# Patient Record
Sex: MALE | Race: WHITE | Employment: OTHER | URBAN - NONMETROPOLITAN AREA
[De-identification: names, ages, dates, MRNs, and addresses within clinical notes are randomized per-mention and may not be internally consistent; named-entity substitution may affect disease eponyms.]

---

## 2024-03-26 ENCOUNTER — HOSPITAL ENCOUNTER (INPATIENT)
Age: 69
LOS: 1 days | Discharge: HOME | DRG: 392 | End: 2024-03-28
Attending: EMERGENCY MEDICINE | Admitting: INTERNAL MEDICINE
Payer: MEDICARE

## 2024-03-26 ENCOUNTER — APPOINTMENT (OUTPATIENT)
Dept: CT IMAGING | Age: 69
DRG: 392 | End: 2024-03-26
Payer: MEDICARE

## 2024-03-26 ENCOUNTER — APPOINTMENT (OUTPATIENT)
Dept: GENERAL RADIOLOGY | Age: 69
DRG: 392 | End: 2024-03-26
Payer: MEDICARE

## 2024-03-26 DIAGNOSIS — R10.13 ACUTE EPIGASTRIC PAIN: ICD-10-CM

## 2024-03-26 DIAGNOSIS — R13.10 DYSPHAGIA, UNSPECIFIED TYPE: ICD-10-CM

## 2024-03-26 DIAGNOSIS — E27.8 ADRENAL MASS, RIGHT (HCC): ICD-10-CM

## 2024-03-26 DIAGNOSIS — R11.10 INTRACTABLE VOMITING: Primary | ICD-10-CM

## 2024-03-26 DIAGNOSIS — R07.9 CHEST PAIN, UNSPECIFIED TYPE: ICD-10-CM

## 2024-03-26 DIAGNOSIS — I10 PRIMARY HYPERTENSION: ICD-10-CM

## 2024-03-26 DIAGNOSIS — E11.9 TYPE 2 DIABETES MELLITUS WITHOUT COMPLICATION, WITHOUT LONG-TERM CURRENT USE OF INSULIN (HCC): ICD-10-CM

## 2024-03-26 LAB
ALBUMIN SERPL-MCNC: 4.8 G/DL (ref 3.5–5.2)
ALBUMIN/GLOB SERPL: 1.5 {RATIO} (ref 1–2.5)
ALP SERPL-CCNC: 67 U/L (ref 40–129)
ALT SERPL-CCNC: 27 U/L (ref 5–41)
ANION GAP SERPL CALCULATED.3IONS-SCNC: 12 MMOL/L (ref 9–17)
AST SERPL-CCNC: 22 U/L
BASOPHILS # BLD: <0.03 K/UL (ref 0–0.2)
BASOPHILS NFR BLD: 0 % (ref 0–2)
BILIRUB SERPL-MCNC: 1 MG/DL (ref 0.3–1.2)
BUN SERPL-MCNC: 14 MG/DL (ref 8–23)
BUN/CREAT SERPL: 18 (ref 9–20)
CALCIUM SERPL-MCNC: 9.7 MG/DL (ref 8.6–10.4)
CHLORIDE SERPL-SCNC: 97 MMOL/L (ref 98–107)
CO2 SERPL-SCNC: 28 MMOL/L (ref 20–31)
CREAT SERPL-MCNC: 0.8 MG/DL (ref 0.7–1.2)
EKG ATRIAL RATE: 68 BPM
EKG P AXIS: 57 DEGREES
EKG P-R INTERVAL: 148 MS
EKG Q-T INTERVAL: 442 MS
EKG QRS DURATION: 88 MS
EKG QTC CALCULATION (BAZETT): 469 MS
EKG R AXIS: -66 DEGREES
EKG T AXIS: 40 DEGREES
EKG VENTRICULAR RATE: 68 BPM
EOSINOPHIL # BLD: 0.42 K/UL (ref 0–0.44)
EOSINOPHILS RELATIVE PERCENT: 8 % (ref 1–4)
ERYTHROCYTE [DISTWIDTH] IN BLOOD BY AUTOMATED COUNT: 12.9 % (ref 11.8–14.4)
GFR SERPL CREATININE-BSD FRML MDRD: >90 ML/MIN/1.73M2
GLUCOSE SERPL-MCNC: 128 MG/DL (ref 70–99)
HCT VFR BLD AUTO: 42.8 % (ref 40.7–50.3)
HGB BLD-MCNC: 14.1 G/DL (ref 13–17)
IMM GRANULOCYTES # BLD AUTO: <0.03 K/UL (ref 0–0.3)
IMM GRANULOCYTES NFR BLD: 0 %
LACTATE BLDV-SCNC: 1.4 MMOL/L (ref 0.5–2.2)
LIPASE SERPL-CCNC: 26 U/L (ref 13–60)
LYMPHOCYTES NFR BLD: 1.39 K/UL (ref 1.1–3.7)
LYMPHOCYTES RELATIVE PERCENT: 28 % (ref 24–43)
MAGNESIUM SERPL-MCNC: 1.8 MG/DL (ref 1.6–2.6)
MCH RBC QN AUTO: 28.6 PG (ref 25.2–33.5)
MCHC RBC AUTO-ENTMCNC: 32.9 G/DL (ref 28.4–34.8)
MCV RBC AUTO: 86.8 FL (ref 82.6–102.9)
MONOCYTES NFR BLD: 0.64 K/UL (ref 0.1–1.2)
MONOCYTES NFR BLD: 13 % (ref 3–12)
NEUTROPHILS NFR BLD: 51 % (ref 36–65)
NEUTS SEG NFR BLD: 2.5 K/UL (ref 1.5–8.1)
NRBC BLD-RTO: 0 PER 100 WBC
PLATELET # BLD AUTO: 339 K/UL (ref 138–453)
PMV BLD AUTO: 9.7 FL (ref 8.1–13.5)
POTASSIUM SERPL-SCNC: 4 MMOL/L (ref 3.7–5.3)
PROT SERPL-MCNC: 7.9 G/DL (ref 6.4–8.3)
RBC # BLD AUTO: 4.93 M/UL (ref 4.21–5.77)
SODIUM SERPL-SCNC: 137 MMOL/L (ref 135–144)
TROPONIN I SERPL HS-MCNC: 9 NG/L (ref 0–22)
WBC OTHER # BLD: 5 K/UL (ref 3.5–11.3)

## 2024-03-26 PROCEDURE — 71045 X-RAY EXAM CHEST 1 VIEW: CPT

## 2024-03-26 PROCEDURE — 80053 COMPREHEN METABOLIC PANEL: CPT

## 2024-03-26 PROCEDURE — 93005 ELECTROCARDIOGRAM TRACING: CPT | Performed by: NURSE PRACTITIONER

## 2024-03-26 PROCEDURE — 2580000003 HC RX 258: Performed by: NURSE PRACTITIONER

## 2024-03-26 PROCEDURE — 83690 ASSAY OF LIPASE: CPT

## 2024-03-26 PROCEDURE — 85025 COMPLETE CBC W/AUTO DIFF WBC: CPT

## 2024-03-26 PROCEDURE — 6360000004 HC RX CONTRAST MEDICATION: Performed by: NURSE PRACTITIONER

## 2024-03-26 PROCEDURE — 74177 CT ABD & PELVIS W/CONTRAST: CPT

## 2024-03-26 PROCEDURE — 99285 EMERGENCY DEPT VISIT HI MDM: CPT

## 2024-03-26 PROCEDURE — 96374 THER/PROPH/DIAG INJ IV PUSH: CPT

## 2024-03-26 PROCEDURE — 83605 ASSAY OF LACTIC ACID: CPT

## 2024-03-26 PROCEDURE — A4216 STERILE WATER/SALINE, 10 ML: HCPCS | Performed by: NURSE PRACTITIONER

## 2024-03-26 PROCEDURE — 84484 ASSAY OF TROPONIN QUANT: CPT

## 2024-03-26 PROCEDURE — C9113 INJ PANTOPRAZOLE SODIUM, VIA: HCPCS | Performed by: NURSE PRACTITIONER

## 2024-03-26 PROCEDURE — 83735 ASSAY OF MAGNESIUM: CPT

## 2024-03-26 PROCEDURE — 93010 ELECTROCARDIOGRAM REPORT: CPT | Performed by: INTERNAL MEDICINE

## 2024-03-26 PROCEDURE — 6360000002 HC RX W HCPCS: Performed by: NURSE PRACTITIONER

## 2024-03-26 PROCEDURE — 96375 TX/PRO/DX INJ NEW DRUG ADDON: CPT

## 2024-03-26 RX ORDER — ASPIRIN 81 MG/1
81 TABLET, CHEWABLE ORAL DAILY
Status: ON HOLD | COMMUNITY
End: 2024-03-28 | Stop reason: HOSPADM

## 2024-03-26 RX ORDER — LISINOPRIL 10 MG/1
10 TABLET ORAL DAILY
COMMUNITY

## 2024-03-26 RX ORDER — ATORVASTATIN CALCIUM 80 MG/1
80 TABLET, FILM COATED ORAL DAILY
COMMUNITY

## 2024-03-26 RX ORDER — HYDROCHLOROTHIAZIDE 25 MG/1
25 TABLET ORAL DAILY
COMMUNITY

## 2024-03-26 RX ORDER — 0.9 % SODIUM CHLORIDE 0.9 %
1000 INTRAVENOUS SOLUTION INTRAVENOUS ONCE
Status: COMPLETED | OUTPATIENT
Start: 2024-03-26 | End: 2024-03-26

## 2024-03-26 RX ORDER — ONDANSETRON 2 MG/ML
4 INJECTION INTRAMUSCULAR; INTRAVENOUS ONCE
Status: COMPLETED | OUTPATIENT
Start: 2024-03-26 | End: 2024-03-26

## 2024-03-26 RX ADMIN — IOPAMIDOL 75 ML: 755 INJECTION, SOLUTION INTRAVENOUS at 22:34

## 2024-03-26 RX ADMIN — SODIUM CHLORIDE 1000 ML: 9 INJECTION, SOLUTION INTRAVENOUS at 21:19

## 2024-03-26 RX ADMIN — ONDANSETRON 4 MG: 2 INJECTION INTRAMUSCULAR; INTRAVENOUS at 21:18

## 2024-03-26 RX ADMIN — PANTOPRAZOLE SODIUM 80 MG: 40 INJECTION, POWDER, FOR SOLUTION INTRAVENOUS at 21:19

## 2024-03-26 ASSESSMENT — PAIN - FUNCTIONAL ASSESSMENT: PAIN_FUNCTIONAL_ASSESSMENT: NONE - DENIES PAIN

## 2024-03-26 ASSESSMENT — ENCOUNTER SYMPTOMS
VOMITING: 1
NAUSEA: 1
ABDOMINAL PAIN: 1

## 2024-03-26 ASSESSMENT — LIFESTYLE VARIABLES
HOW OFTEN DO YOU HAVE A DRINK CONTAINING ALCOHOL: NEVER
HOW MANY STANDARD DRINKS CONTAINING ALCOHOL DO YOU HAVE ON A TYPICAL DAY: PATIENT DOES NOT DRINK

## 2024-03-26 ASSESSMENT — HEART SCORE: ECG: NON-SPECIFC REPOLARIZATION DISTURBANCE/LBTB/PM

## 2024-03-27 ENCOUNTER — APPOINTMENT (OUTPATIENT)
Dept: CT IMAGING | Age: 69
DRG: 392 | End: 2024-03-27
Payer: MEDICARE

## 2024-03-27 ENCOUNTER — APPOINTMENT (OUTPATIENT)
Dept: MRI IMAGING | Age: 69
DRG: 392 | End: 2024-03-27
Payer: MEDICARE

## 2024-03-27 ENCOUNTER — ANESTHESIA EVENT (OUTPATIENT)
Dept: OPERATING ROOM | Age: 69
DRG: 392 | End: 2024-03-27
Payer: MEDICARE

## 2024-03-27 PROBLEM — I10 HYPERTENSION: Status: ACTIVE | Noted: 2024-03-27

## 2024-03-27 PROBLEM — E11.9 TYPE 2 DIABETES MELLITUS WITHOUT COMPLICATION, WITHOUT LONG-TERM CURRENT USE OF INSULIN (HCC): Status: ACTIVE | Noted: 2024-03-27

## 2024-03-27 PROBLEM — R10.13 ACUTE EPIGASTRIC PAIN: Status: ACTIVE | Noted: 2024-03-27

## 2024-03-27 LAB
ANION GAP SERPL CALCULATED.3IONS-SCNC: 12 MMOL/L (ref 9–17)
BASOPHILS # BLD: 0.03 K/UL (ref 0–0.2)
BASOPHILS NFR BLD: 0 % (ref 0–2)
BUN SERPL-MCNC: 11 MG/DL (ref 8–23)
BUN/CREAT SERPL: 16 (ref 9–20)
CALCIUM SERPL-MCNC: 8.6 MG/DL (ref 8.6–10.4)
CHLORIDE SERPL-SCNC: 104 MMOL/L (ref 98–107)
CO2 SERPL-SCNC: 24 MMOL/L (ref 20–31)
CREAT SERPL-MCNC: 0.7 MG/DL (ref 0.7–1.2)
EKG ATRIAL RATE: 75 BPM
EKG P AXIS: 57 DEGREES
EKG P-R INTERVAL: 140 MS
EKG Q-T INTERVAL: 438 MS
EKG QRS DURATION: 96 MS
EKG QTC CALCULATION (BAZETT): 489 MS
EKG R AXIS: -68 DEGREES
EKG T AXIS: 41 DEGREES
EKG VENTRICULAR RATE: 75 BPM
EOSINOPHIL # BLD: 0.35 K/UL (ref 0–0.44)
EOSINOPHILS RELATIVE PERCENT: 5 % (ref 1–4)
ERYTHROCYTE [DISTWIDTH] IN BLOOD BY AUTOMATED COUNT: 13 % (ref 11.8–14.4)
GFR SERPL CREATININE-BSD FRML MDRD: >90 ML/MIN/1.73M2
GLUCOSE SERPL-MCNC: 121 MG/DL (ref 70–99)
HCT VFR BLD AUTO: 37.9 % (ref 40.7–50.3)
HGB BLD-MCNC: 12.5 G/DL (ref 13–17)
IMM GRANULOCYTES # BLD AUTO: <0.03 K/UL (ref 0–0.3)
IMM GRANULOCYTES NFR BLD: 0 %
LIPASE SERPL-CCNC: 20 U/L (ref 13–60)
LYMPHOCYTES NFR BLD: 1.21 K/UL (ref 1.1–3.7)
LYMPHOCYTES RELATIVE PERCENT: 16 % (ref 24–43)
MCH RBC QN AUTO: 28.7 PG (ref 25.2–33.5)
MCHC RBC AUTO-ENTMCNC: 33 G/DL (ref 28.4–34.8)
MCV RBC AUTO: 87.1 FL (ref 82.6–102.9)
MONOCYTES NFR BLD: 0.88 K/UL (ref 0.1–1.2)
MONOCYTES NFR BLD: 12 % (ref 3–12)
NEUTROPHILS NFR BLD: 67 % (ref 36–65)
NEUTS SEG NFR BLD: 4.94 K/UL (ref 1.5–8.1)
NRBC BLD-RTO: 0 PER 100 WBC
PLATELET # BLD AUTO: 312 K/UL (ref 138–453)
PMV BLD AUTO: 9.7 FL (ref 8.1–13.5)
POTASSIUM SERPL-SCNC: 3.8 MMOL/L (ref 3.7–5.3)
RBC # BLD AUTO: 4.35 M/UL (ref 4.21–5.77)
SODIUM SERPL-SCNC: 140 MMOL/L (ref 135–144)
TROPONIN I SERPL HS-MCNC: 10 NG/L (ref 0–22)
TROPONIN I SERPL HS-MCNC: 9 NG/L (ref 0–22)
WBC OTHER # BLD: 7.4 K/UL (ref 3.5–11.3)

## 2024-03-27 PROCEDURE — 6360000002 HC RX W HCPCS: Performed by: EMERGENCY MEDICINE

## 2024-03-27 PROCEDURE — 94761 N-INVAS EAR/PLS OXIMETRY MLT: CPT

## 2024-03-27 PROCEDURE — 74183 MRI ABD W/O CNTR FLWD CNTR: CPT

## 2024-03-27 PROCEDURE — 1200000000 HC SEMI PRIVATE

## 2024-03-27 PROCEDURE — 36415 COLL VENOUS BLD VENIPUNCTURE: CPT

## 2024-03-27 PROCEDURE — 93005 ELECTROCARDIOGRAM TRACING: CPT

## 2024-03-27 PROCEDURE — A4216 STERILE WATER/SALINE, 10 ML: HCPCS

## 2024-03-27 PROCEDURE — C9113 INJ PANTOPRAZOLE SODIUM, VIA: HCPCS

## 2024-03-27 PROCEDURE — 93010 ELECTROCARDIOGRAM REPORT: CPT | Performed by: INTERNAL MEDICINE

## 2024-03-27 PROCEDURE — 85025 COMPLETE CBC W/AUTO DIFF WBC: CPT

## 2024-03-27 PROCEDURE — 2580000003 HC RX 258: Performed by: EMERGENCY MEDICINE

## 2024-03-27 PROCEDURE — 2580000003 HC RX 258

## 2024-03-27 PROCEDURE — 84484 ASSAY OF TROPONIN QUANT: CPT

## 2024-03-27 PROCEDURE — 6370000000 HC RX 637 (ALT 250 FOR IP): Performed by: EMERGENCY MEDICINE

## 2024-03-27 PROCEDURE — 6360000004 HC RX CONTRAST MEDICATION: Performed by: NURSE PRACTITIONER

## 2024-03-27 PROCEDURE — 71260 CT THORAX DX C+: CPT

## 2024-03-27 PROCEDURE — 83690 ASSAY OF LIPASE: CPT

## 2024-03-27 PROCEDURE — 80048 BASIC METABOLIC PNL TOTAL CA: CPT

## 2024-03-27 PROCEDURE — 96376 TX/PRO/DX INJ SAME DRUG ADON: CPT

## 2024-03-27 PROCEDURE — 6360000002 HC RX W HCPCS

## 2024-03-27 PROCEDURE — A9579 GAD-BASE MR CONTRAST NOS,1ML: HCPCS | Performed by: NURSE PRACTITIONER

## 2024-03-27 RX ORDER — SODIUM CHLORIDE 0.9 % (FLUSH) 0.9 %
10 SYRINGE (ML) INJECTION PRN
Status: DISCONTINUED | OUTPATIENT
Start: 2024-03-27 | End: 2024-03-28 | Stop reason: HOSPADM

## 2024-03-27 RX ORDER — ACETAMINOPHEN 325 MG/1
650 TABLET ORAL EVERY 6 HOURS PRN
Status: DISCONTINUED | OUTPATIENT
Start: 2024-03-27 | End: 2024-03-28 | Stop reason: HOSPADM

## 2024-03-27 RX ORDER — POLYETHYLENE GLYCOL 3350 17 G/17G
17 POWDER, FOR SOLUTION ORAL DAILY PRN
Status: DISCONTINUED | OUTPATIENT
Start: 2024-03-27 | End: 2024-03-28 | Stop reason: HOSPADM

## 2024-03-27 RX ORDER — ASPIRIN 81 MG/1
81 TABLET, CHEWABLE ORAL DAILY
Status: DISCONTINUED | OUTPATIENT
Start: 2024-03-27 | End: 2024-03-28 | Stop reason: HOSPADM

## 2024-03-27 RX ORDER — ENOXAPARIN SODIUM 100 MG/ML
40 INJECTION SUBCUTANEOUS DAILY
Status: DISCONTINUED | OUTPATIENT
Start: 2024-03-27 | End: 2024-03-28 | Stop reason: HOSPADM

## 2024-03-27 RX ORDER — SODIUM CHLORIDE 9 MG/ML
INJECTION, SOLUTION INTRAVENOUS CONTINUOUS
Status: DISCONTINUED | OUTPATIENT
Start: 2024-03-27 | End: 2024-03-28 | Stop reason: HOSPADM

## 2024-03-27 RX ORDER — SODIUM CHLORIDE 9 MG/ML
INJECTION, SOLUTION INTRAVENOUS PRN
Status: DISCONTINUED | OUTPATIENT
Start: 2024-03-27 | End: 2024-03-28 | Stop reason: HOSPADM

## 2024-03-27 RX ORDER — ONDANSETRON 2 MG/ML
4 INJECTION INTRAMUSCULAR; INTRAVENOUS EVERY 6 HOURS PRN
Status: DISCONTINUED | OUTPATIENT
Start: 2024-03-27 | End: 2024-03-28 | Stop reason: HOSPADM

## 2024-03-27 RX ORDER — ONDANSETRON 2 MG/ML
4 INJECTION INTRAMUSCULAR; INTRAVENOUS ONCE
Status: COMPLETED | OUTPATIENT
Start: 2024-03-27 | End: 2024-03-27

## 2024-03-27 RX ORDER — SODIUM CHLORIDE 0.9 % (FLUSH) 0.9 %
10 SYRINGE (ML) INJECTION EVERY 12 HOURS SCHEDULED
Status: DISCONTINUED | OUTPATIENT
Start: 2024-03-27 | End: 2024-03-28 | Stop reason: HOSPADM

## 2024-03-27 RX ORDER — LISINOPRIL 10 MG/1
10 TABLET ORAL DAILY
Status: DISCONTINUED | OUTPATIENT
Start: 2024-03-27 | End: 2024-03-28 | Stop reason: HOSPADM

## 2024-03-27 RX ORDER — POTASSIUM CHLORIDE 7.45 MG/ML
10 INJECTION INTRAVENOUS PRN
Status: DISCONTINUED | OUTPATIENT
Start: 2024-03-27 | End: 2024-03-28 | Stop reason: HOSPADM

## 2024-03-27 RX ORDER — 0.9 % SODIUM CHLORIDE 0.9 %
1000 INTRAVENOUS SOLUTION INTRAVENOUS ONCE
Status: COMPLETED | OUTPATIENT
Start: 2024-03-27 | End: 2024-03-27

## 2024-03-27 RX ORDER — HYDROCHLOROTHIAZIDE 25 MG/1
25 TABLET ORAL DAILY
Status: DISCONTINUED | OUTPATIENT
Start: 2024-03-27 | End: 2024-03-28 | Stop reason: HOSPADM

## 2024-03-27 RX ORDER — ACETAMINOPHEN 650 MG/1
650 SUPPOSITORY RECTAL EVERY 6 HOURS PRN
Status: DISCONTINUED | OUTPATIENT
Start: 2024-03-27 | End: 2024-03-28 | Stop reason: HOSPADM

## 2024-03-27 RX ORDER — POTASSIUM CHLORIDE 20 MEQ/1
40 TABLET, EXTENDED RELEASE ORAL PRN
Status: DISCONTINUED | OUTPATIENT
Start: 2024-03-27 | End: 2024-03-28 | Stop reason: HOSPADM

## 2024-03-27 RX ORDER — ONDANSETRON 4 MG/1
4 TABLET, ORALLY DISINTEGRATING ORAL EVERY 8 HOURS PRN
Status: DISCONTINUED | OUTPATIENT
Start: 2024-03-27 | End: 2024-03-28 | Stop reason: HOSPADM

## 2024-03-27 RX ORDER — ATORVASTATIN CALCIUM 40 MG/1
80 TABLET, FILM COATED ORAL DAILY
Status: DISCONTINUED | OUTPATIENT
Start: 2024-03-27 | End: 2024-03-28 | Stop reason: HOSPADM

## 2024-03-27 RX ORDER — FAMOTIDINE 20 MG/1
20 TABLET, FILM COATED ORAL 2 TIMES DAILY
Status: DISCONTINUED | OUTPATIENT
Start: 2024-03-27 | End: 2024-03-28 | Stop reason: HOSPADM

## 2024-03-27 RX ADMIN — ALUMINUM HYDROXIDE, MAGNESIUM HYDROXIDE, AND SIMETHICONE: 200; 200; 20 SUSPENSION ORAL at 02:54

## 2024-03-27 RX ADMIN — SODIUM CHLORIDE: 9 INJECTION, SOLUTION INTRAVENOUS at 17:40

## 2024-03-27 RX ADMIN — IOPAMIDOL 75 ML: 755 INJECTION, SOLUTION INTRAVENOUS at 12:30

## 2024-03-27 RX ADMIN — GADOTERIDOL 14 ML: 279.3 INJECTION, SOLUTION INTRAVENOUS at 16:46

## 2024-03-27 RX ADMIN — PANTOPRAZOLE SODIUM 40 MG: 40 INJECTION, POWDER, FOR SOLUTION INTRAVENOUS at 08:46

## 2024-03-27 RX ADMIN — SODIUM CHLORIDE: 9 INJECTION, SOLUTION INTRAVENOUS at 04:12

## 2024-03-27 RX ADMIN — ONDANSETRON 4 MG: 2 INJECTION INTRAMUSCULAR; INTRAVENOUS at 02:54

## 2024-03-27 RX ADMIN — SODIUM CHLORIDE 1000 ML: 9 INJECTION, SOLUTION INTRAVENOUS at 02:53

## 2024-03-27 RX ADMIN — ENOXAPARIN SODIUM 40 MG: 100 INJECTION SUBCUTANEOUS at 08:46

## 2024-03-27 ASSESSMENT — LIFESTYLE VARIABLES
HOW MANY STANDARD DRINKS CONTAINING ALCOHOL DO YOU HAVE ON A TYPICAL DAY: PATIENT DOES NOT DRINK
HOW OFTEN DO YOU HAVE A DRINK CONTAINING ALCOHOL: NEVER

## 2024-03-27 NOTE — CONSULTS
Patient was not able to be seen prior to discharge. Patient initially in MRI on 1st attempt. Patient in Endoscopy on 2nd attempt. Patient already discharged on 3rd attempt.

## 2024-03-27 NOTE — H&P
History and Physical    Patient:  Keven Iyer  MRN: [id number]    Chief Complaint: Emesis    History Obtained From:  patient, spouse, electronic medical record    PCP: Vernon Vincent DO    History of Present Illness:   The patient is a 68 y.o. male who presents with emesis and epigastric pain.  Patient states he started with epigastric pain after dinner this evening.  The pain radiated up into his throat and he began vomiting.  He states he has had approximately 10-12 episodes of vomiting.  He states that he has been having hiccups with it.  He states his emesis has been mostly clear with occasional undigested food.  He denies any diarrhea or constipation.  He denies chest pain, shortness of breath, syncope, fever or chills.  He states occasionally he gets reflux but this feels different.  Past medical history includes hypertension, type 2 diabetes, CVA and carotid endarterectomy.  Troponin 9 and 10, no leukocytosis, EKG shows normal sinus rhythm without ST or T wave changes.  CT abdomen pelvis showed no acute intra-abdominal or pelvic process and a small hiatal hernia.  Patient was given IV Protonix, Zofran and a GI cocktail in the emergency department without much improvement of symptoms.    Past Medical History:        Diagnosis Date    CVA (cerebral vascular accident) (HCC)     Diabetes mellitus (HCC)     Hypertension        Past Surgical History:        Procedure Laterality Date    CAROTID ENDARTERECTOMY         Medications Prior to Admission:    Prior to Admission medications    Medication Sig Start Date End Date Taking? Authorizing Provider   hydroCHLOROthiazide (HYDRODIURIL) 25 MG tablet Take 1 tablet by mouth daily   Yes ProviderCynthia MD   metFORMIN (GLUCOPHAGE) 1000 MG tablet Take 1 tablet by mouth 2 times daily (with meals)   Yes ProviderCynthia MD   lisinopril (PRINIVIL;ZESTRIL) 10 MG tablet Take 1 tablet by mouth daily   Yes ProviderCynthia MD   atorvastatin (LIPITOR) 80 MG tablet

## 2024-03-27 NOTE — ED PROVIDER NOTES
[facility] ED  EMERGENCY DEPARTMENT ENCOUNTER      Pt Name: Keven Iyer  MRN: [id number]  Birthdate 1955  Date of evaluation: 3/26/2024  Provider: RYAN Huang CNP  10:47 PM    CHIEF COMPLAINT       Chief Complaint   Patient presents with    Abdominal Pain     Epigastric pain radiating up throat and then vomit. Has had 10-12 episodes within the last 2 hours. Started during dinner.          HISTORY OF PRESENT ILLNESS        Keven Iyer 69 yo male presents from home to ED with c/o abdominal pain.Epigastric pain radiating up throat and then vomit. Has had 10-12 episodes within the last 2 hours. Hiccups intermittently. Started during dinner. No hx of reflex.  .    The history is provided by the patient. No  was used.       Nursing Notes were reviewed.    REVIEW OF SYSTEMS       Review of Systems   Gastrointestinal:  Positive for abdominal pain, nausea and vomiting.        Epigastric, hiccups, reflux   All other systems reviewed and are negative.      Except as noted above the remainder of the review of systems was reviewed and negative.       PAST MEDICAL HISTORY   History reviewed. No pertinent past medical history.      SURGICAL HISTORY     History reviewed. No pertinent surgical history.      CURRENT MEDICATIONS       Previous Medications    ASPIRIN 81 MG CHEWABLE TABLET    Take 1 tablet by mouth daily    ATORVASTATIN (LIPITOR) 80 MG TABLET    Take 1 tablet by mouth daily    HYDROCHLOROTHIAZIDE (HYDRODIURIL) 25 MG TABLET    Take 1 tablet by mouth daily    LISINOPRIL (PRINIVIL;ZESTRIL) 10 MG TABLET    Take 1 tablet by mouth daily    METFORMIN (GLUCOPHAGE) 1000 MG TABLET    Take 1 tablet by mouth 2 times daily (with meals)       ALLERGIES     Codeine    FAMILY HISTORY     History reviewed. No pertinent family history.       SOCIAL HISTORY       Social History     Socioeconomic History    Marital status:      Spouse name: None    Number of children: None    
and without gadolinium for further   evaluation.  Neoplasm not excluded.   3. Small hiatal hernia.   4. Fat containing left inguinal hernia.   5. Calcific coronary artery disease.   6. Slight anterior wedging T11-T12. Vacuum disc phenomena L5-S1.         XR CHEST PORTABLE   Final Result   No acute process.               ED BEDSIDE ULTRASOUND:   Performed by ED Physician - none    LABS:  Labs Reviewed   COMPREHENSIVE METABOLIC PANEL - Abnormal; Notable for the following components:       Result Value    Chloride 97 (*)     Glucose 128 (*)     All other components within normal limits   CBC WITH AUTO DIFFERENTIAL - Abnormal; Notable for the following components:    Monocytes % 13 (*)     Eosinophils % 8 (*)     All other components within normal limits   CBC WITH AUTO DIFFERENTIAL - Abnormal; Notable for the following components:    Hemoglobin 12.5 (*)     Hematocrit 37.9 (*)     Neutrophils % 67 (*)     Lymphocytes % 16 (*)     Eosinophils % 5 (*)     All other components within normal limits   MAGNESIUM   LIPASE   LACTIC ACID   TROPONIN   TROPONIN   BASIC METABOLIC PANEL W/ REFLEX TO MG FOR LOW K   TROPONIN   LIPASE       All other labs were within normal range or not returned as of this dictation.    EMERGENCY DEPARTMENT COURSE and DIFFERENTIAL DIAGNOSIS/MDM:   Vitals:    Vitals:    03/26/24 2055 03/27/24 0350 03/27/24 0407   BP: (!) 164/72 (!) 174/71 (!) 188/73   Pulse: 66  66   Resp: 16  18   Temp: 97.9 °F (36.6 °C)  97.9 °F (36.6 °C)   TempSrc: Oral  Temporal   SpO2: 97% 96% 98%   Weight: 66.2 kg (146 lb)  71 kg (156 lb 9.6 oz)   Height:   1.651 m (5' 5\")           MDM     Amount and/or Complexity of Data Reviewed  Clinical lab tests: reviewed        MIPS       REASSESSMENT          CRITICAL CARE TIME   Total Critical Care time was  minutes, excluding separately reportable procedures.  There was a high probability of clinically significant/life threatening deterioration in the patient's condition which required

## 2024-03-27 NOTE — FLOWSHEET NOTE
Vitals rechecked. Denies pain or nausea. States he is unable to keep even water down. No emesis since this morning. NPO. Call light within reach. Continue to monitor

## 2024-03-27 NOTE — FLOWSHEET NOTE
Resting in bed with eyes closed. Wake for vitals and assessment . States he has had 2 emesis since coming to the floor at 0400. None seen by nurses. Informed to save emesis for nurse to see. C/O mid epigastric discomfort this morning. No needs at present time. Call light within reach. Continue to monitor

## 2024-03-27 NOTE — ANESTHESIA PRE-OP
Chart reviewed as requested. The most recent EKG shows possible infarct. Cardiac consult/ surgical clearance requested.

## 2024-03-28 ENCOUNTER — APPOINTMENT (OUTPATIENT)
Age: 69
DRG: 392 | End: 2024-03-28
Attending: FAMILY MEDICINE
Payer: MEDICARE

## 2024-03-28 ENCOUNTER — ANESTHESIA (OUTPATIENT)
Dept: OPERATING ROOM | Age: 69
DRG: 392 | End: 2024-03-28
Payer: MEDICARE

## 2024-03-28 VITALS
WEIGHT: 156.53 LBS | HEIGHT: 65 IN | DIASTOLIC BLOOD PRESSURE: 65 MMHG | SYSTOLIC BLOOD PRESSURE: 159 MMHG | RESPIRATION RATE: 18 BRPM | OXYGEN SATURATION: 97 % | HEART RATE: 70 BPM | BODY MASS INDEX: 26.08 KG/M2 | TEMPERATURE: 97.5 F

## 2024-03-28 PROBLEM — K29.80 DUODENITIS: Status: ACTIVE | Noted: 2024-03-28

## 2024-03-28 PROBLEM — K20.90 ESOPHAGITIS: Status: ACTIVE | Noted: 2024-03-28

## 2024-03-28 LAB
ANION GAP SERPL CALCULATED.3IONS-SCNC: 13 MMOL/L (ref 9–17)
BASOPHILS # BLD: 0 K/UL (ref 0–0.2)
BASOPHILS NFR BLD: 0 % (ref 0–2)
BUN SERPL-MCNC: 11 MG/DL (ref 8–23)
BUN/CREAT SERPL: 16 (ref 9–20)
CALCIUM SERPL-MCNC: 8.6 MG/DL (ref 8.6–10.4)
CHLORIDE SERPL-SCNC: 104 MMOL/L (ref 98–107)
CO2 SERPL-SCNC: 22 MMOL/L (ref 20–31)
CREAT SERPL-MCNC: 0.7 MG/DL (ref 0.7–1.2)
ECHO AO ROOT DIAM: 3.3 CM
ECHO AO ROOT INDEX: 1.85 CM/M2
ECHO AV CUSP MM: 1.6 CM
ECHO AV MEAN GRADIENT: 5 MMHG
ECHO AV MEAN VELOCITY: 1 M/S
ECHO AV PEAK GRADIENT: 9 MMHG
ECHO AV PEAK VELOCITY: 1.5 M/S
ECHO AV VELOCITY RATIO: 0.67
ECHO AV VTI: 31.2 CM
ECHO BSA: 1.8 M2
ECHO LA AREA 2C: 13 CM2
ECHO LA AREA 4C: 13.1 CM2
ECHO LA MAJOR AXIS: 4.9 CM
ECHO LA MINOR AXIS: 4.3 CM
ECHO LA VOL BP: 32 ML (ref 18–58)
ECHO LA VOL MOD A2C: 32 ML (ref 18–58)
ECHO LA VOL MOD A4C: 28 ML (ref 18–58)
ECHO LA VOL/BSA BIPLANE: 18 ML/M2 (ref 16–34)
ECHO LA VOLUME INDEX MOD A2C: 18 ML/M2 (ref 16–34)
ECHO LA VOLUME INDEX MOD A4C: 16 ML/M2 (ref 16–34)
ECHO LV E' LATERAL VELOCITY: 8 CM/S
ECHO LV EDV A2C: 45 ML
ECHO LV EDV A4C: 58 ML
ECHO LV EDV INDEX A4C: 33 ML/M2
ECHO LV EDV NDEX A2C: 25 ML/M2
ECHO LV EJECTION FRACTION A2C: 58 %
ECHO LV EJECTION FRACTION A4C: 63 %
ECHO LV EJECTION FRACTION BIPLANE: 58 % (ref 55–100)
ECHO LV ESV A2C: 19 ML
ECHO LV ESV A4C: 22 ML
ECHO LV ESV INDEX A2C: 11 ML/M2
ECHO LV ESV INDEX A4C: 12 ML/M2
ECHO LV FRACTIONAL SHORTENING: 34 % (ref 28–44)
ECHO LV INTERNAL DIMENSION DIASTOLE INDEX: 2.47 CM/M2
ECHO LV INTERNAL DIMENSION DIASTOLIC: 4.4 CM (ref 4.2–5.9)
ECHO LV INTERNAL DIMENSION SYSTOLIC INDEX: 1.63 CM/M2
ECHO LV INTERNAL DIMENSION SYSTOLIC: 2.9 CM
ECHO LV IVSD: 0.9 CM (ref 0.6–1)
ECHO LV MASS 2D: 128 G (ref 88–224)
ECHO LV MASS INDEX 2D: 71.9 G/M2 (ref 49–115)
ECHO LV POSTERIOR WALL DIASTOLIC: 0.9 CM (ref 0.6–1)
ECHO LV RELATIVE WALL THICKNESS RATIO: 0.41
ECHO LVOT AV VTI INDEX: 0.51
ECHO LVOT MEAN GRADIENT: 2 MMHG
ECHO LVOT PEAK GRADIENT: 4 MMHG
ECHO LVOT PEAK VELOCITY: 1 M/S
ECHO LVOT VTI: 15.9 CM
ECHO MV A VELOCITY: 1.39 M/S
ECHO MV E DECELERATION TIME (DT): 177 MS
ECHO MV E VELOCITY: 1.14 M/S
ECHO MV E/A RATIO: 0.82
ECHO MV E/E' LATERAL: 14.25
ECHO PV MAX VELOCITY: 1.1 M/S
ECHO PV PEAK GRADIENT: 5 MMHG
EOSINOPHIL # BLD: 0.32 K/UL (ref 0–0.44)
EOSINOPHILS RELATIVE PERCENT: 7 % (ref 1–4)
ERYTHROCYTE [DISTWIDTH] IN BLOOD BY AUTOMATED COUNT: 12.9 % (ref 11.8–14.4)
GFR SERPL CREATININE-BSD FRML MDRD: >90 ML/MIN/1.73M2
GLUCOSE SERPL-MCNC: 101 MG/DL (ref 70–99)
HCT VFR BLD AUTO: 37.2 % (ref 40.7–50.3)
HGB BLD-MCNC: 12.3 G/DL (ref 13–17)
IMM GRANULOCYTES # BLD AUTO: 0 K/UL (ref 0–0.3)
IMM GRANULOCYTES NFR BLD: 0 %
LYMPHOCYTES NFR BLD: 1.4 K/UL (ref 1.1–3.7)
LYMPHOCYTES RELATIVE PERCENT: 31 % (ref 24–43)
MCH RBC QN AUTO: 28.8 PG (ref 25.2–33.5)
MCHC RBC AUTO-ENTMCNC: 33.1 G/DL (ref 28.4–34.8)
MCV RBC AUTO: 87.1 FL (ref 82.6–102.9)
MONOCYTES NFR BLD: 0.63 K/UL (ref 0.1–1.2)
MONOCYTES NFR BLD: 14 % (ref 3–12)
MORPHOLOGY: NORMAL
NEUTROPHILS NFR BLD: 48 % (ref 36–65)
NEUTS SEG NFR BLD: 2.15 K/UL (ref 1.5–8.1)
NRBC BLD-RTO: 0 PER 100 WBC
PLATELET # BLD AUTO: 308 K/UL (ref 138–453)
PMV BLD AUTO: 9.4 FL (ref 8.1–13.5)
POTASSIUM SERPL-SCNC: 4 MMOL/L (ref 3.7–5.3)
RBC # BLD AUTO: 4.27 M/UL (ref 4.21–5.77)
SODIUM SERPL-SCNC: 139 MMOL/L (ref 135–144)
WBC OTHER # BLD: 4.5 K/UL (ref 3.5–11.3)

## 2024-03-28 PROCEDURE — 6370000000 HC RX 637 (ALT 250 FOR IP): Performed by: INTERNAL MEDICINE

## 2024-03-28 PROCEDURE — 2580000003 HC RX 258: Performed by: NURSE ANESTHETIST, CERTIFIED REGISTERED

## 2024-03-28 PROCEDURE — C9113 INJ PANTOPRAZOLE SODIUM, VIA: HCPCS

## 2024-03-28 PROCEDURE — 7100000011 HC PHASE II RECOVERY - ADDTL 15 MIN: Performed by: INTERNAL MEDICINE

## 2024-03-28 PROCEDURE — 2709999900 HC NON-CHARGEABLE SUPPLY: Performed by: INTERNAL MEDICINE

## 2024-03-28 PROCEDURE — 6370000000 HC RX 637 (ALT 250 FOR IP): Performed by: NURSE PRACTITIONER

## 2024-03-28 PROCEDURE — 3609012400 HC EGD TRANSORAL BIOPSY SINGLE/MULTIPLE: Performed by: INTERNAL MEDICINE

## 2024-03-28 PROCEDURE — 43239 EGD BIOPSY SINGLE/MULTIPLE: CPT | Performed by: INTERNAL MEDICINE

## 2024-03-28 PROCEDURE — 3700000001 HC ADD 15 MINUTES (ANESTHESIA): Performed by: INTERNAL MEDICINE

## 2024-03-28 PROCEDURE — 2500000003 HC RX 250 WO HCPCS: Performed by: NURSE ANESTHETIST, CERTIFIED REGISTERED

## 2024-03-28 PROCEDURE — 36415 COLL VENOUS BLD VENIPUNCTURE: CPT

## 2024-03-28 PROCEDURE — 85025 COMPLETE CBC W/AUTO DIFF WBC: CPT

## 2024-03-28 PROCEDURE — 6360000002 HC RX W HCPCS: Performed by: NURSE ANESTHETIST, CERTIFIED REGISTERED

## 2024-03-28 PROCEDURE — 7100000010 HC PHASE II RECOVERY - FIRST 15 MIN: Performed by: INTERNAL MEDICINE

## 2024-03-28 PROCEDURE — 99221 1ST HOSP IP/OBS SF/LOW 40: CPT | Performed by: INTERNAL MEDICINE

## 2024-03-28 PROCEDURE — 2580000003 HC RX 258

## 2024-03-28 PROCEDURE — 94761 N-INVAS EAR/PLS OXIMETRY MLT: CPT

## 2024-03-28 PROCEDURE — 3700000000 HC ANESTHESIA ATTENDED CARE: Performed by: INTERNAL MEDICINE

## 2024-03-28 PROCEDURE — 0DJ08ZZ INSPECTION OF UPPER INTESTINAL TRACT, VIA NATURAL OR ARTIFICIAL OPENING ENDOSCOPIC: ICD-10-PCS | Performed by: INTERNAL MEDICINE

## 2024-03-28 PROCEDURE — A4216 STERILE WATER/SALINE, 10 ML: HCPCS

## 2024-03-28 PROCEDURE — 93306 TTE W/DOPPLER COMPLETE: CPT

## 2024-03-28 PROCEDURE — 6360000002 HC RX W HCPCS

## 2024-03-28 PROCEDURE — 80048 BASIC METABOLIC PNL TOTAL CA: CPT

## 2024-03-28 RX ORDER — PANTOPRAZOLE SODIUM 40 MG/1
40 TABLET, DELAYED RELEASE ORAL
Status: DISCONTINUED | OUTPATIENT
Start: 2024-03-29 | End: 2024-03-28 | Stop reason: HOSPADM

## 2024-03-28 RX ORDER — SUCRALFATE 1 G/1
1 TABLET ORAL
Qty: 120 TABLET | Refills: 0 | Status: SHIPPED | OUTPATIENT
Start: 2024-03-28 | End: 2024-04-27

## 2024-03-28 RX ORDER — PROPOFOL 10 MG/ML
INJECTION, EMULSION INTRAVENOUS PRN
Status: DISCONTINUED | OUTPATIENT
Start: 2024-03-28 | End: 2024-03-28 | Stop reason: SDUPTHER

## 2024-03-28 RX ORDER — SUCRALFATE 1 G/1
1 TABLET ORAL
Status: DISCONTINUED | OUTPATIENT
Start: 2024-03-28 | End: 2024-03-28 | Stop reason: HOSPADM

## 2024-03-28 RX ORDER — SODIUM CHLORIDE, SODIUM LACTATE, POTASSIUM CHLORIDE, CALCIUM CHLORIDE 600; 310; 30; 20 MG/100ML; MG/100ML; MG/100ML; MG/100ML
INJECTION, SOLUTION INTRAVENOUS CONTINUOUS PRN
Status: DISCONTINUED | OUTPATIENT
Start: 2024-03-28 | End: 2024-03-28 | Stop reason: SDUPTHER

## 2024-03-28 RX ORDER — LIDOCAINE HYDROCHLORIDE 20 MG/ML
INJECTION, SOLUTION EPIDURAL; INFILTRATION; INTRACAUDAL; PERINEURAL PRN
Status: DISCONTINUED | OUTPATIENT
Start: 2024-03-28 | End: 2024-03-28 | Stop reason: SDUPTHER

## 2024-03-28 RX ORDER — HYDRALAZINE HYDROCHLORIDE 20 MG/ML
INJECTION INTRAMUSCULAR; INTRAVENOUS PRN
Status: DISCONTINUED | OUTPATIENT
Start: 2024-03-28 | End: 2024-03-28 | Stop reason: SDUPTHER

## 2024-03-28 RX ORDER — SODIUM CHLORIDE, SODIUM LACTATE, POTASSIUM CHLORIDE, CALCIUM CHLORIDE 600; 310; 30; 20 MG/100ML; MG/100ML; MG/100ML; MG/100ML
INJECTION, SOLUTION INTRAVENOUS CONTINUOUS
Status: DISCONTINUED | OUTPATIENT
Start: 2024-03-28 | End: 2024-03-28 | Stop reason: HOSPADM

## 2024-03-28 RX ORDER — PANTOPRAZOLE SODIUM 40 MG/1
40 TABLET, DELAYED RELEASE ORAL
Qty: 30 TABLET | Refills: 0 | Status: SHIPPED | OUTPATIENT
Start: 2024-03-29 | End: 2024-04-28

## 2024-03-28 RX ADMIN — LIDOCAINE HYDROCHLORIDE 100 MG: 20 INJECTION, SOLUTION EPIDURAL; INFILTRATION; INTRACAUDAL; PERINEURAL at 10:38

## 2024-03-28 RX ADMIN — HYDRALAZINE HYDROCHLORIDE 10 MG: 20 INJECTION, SOLUTION INTRAMUSCULAR; INTRAVENOUS at 09:17

## 2024-03-28 RX ADMIN — PROPOFOL 150 MCG/KG/MIN: 10 INJECTION, EMULSION INTRAVENOUS at 10:39

## 2024-03-28 RX ADMIN — PROPOFOL 80 MG: 10 INJECTION, EMULSION INTRAVENOUS at 10:38

## 2024-03-28 RX ADMIN — SUCRALFATE 1 G: 1 TABLET ORAL at 12:42

## 2024-03-28 RX ADMIN — PANTOPRAZOLE SODIUM 40 MG: 40 INJECTION, POWDER, FOR SOLUTION INTRAVENOUS at 08:14

## 2024-03-28 RX ADMIN — SUCRALFATE 1 G: 1 TABLET ORAL at 16:47

## 2024-03-28 RX ADMIN — LISINOPRIL 10 MG: 10 TABLET ORAL at 13:24

## 2024-03-28 RX ADMIN — SODIUM CHLORIDE, POTASSIUM CHLORIDE, SODIUM LACTATE AND CALCIUM CHLORIDE: 600; 310; 30; 20 INJECTION, SOLUTION INTRAVENOUS at 10:31

## 2024-03-28 RX ADMIN — SODIUM CHLORIDE, PRESERVATIVE FREE 10 ML: 5 INJECTION INTRAVENOUS at 08:14

## 2024-03-28 RX ADMIN — HYDROCHLOROTHIAZIDE 25 MG: 25 TABLET ORAL at 13:24

## 2024-03-28 NOTE — FLOWSHEET NOTE
Awake, sitting up in bed. Vitals checked and assessment completed . Denies pain. States no nausea this morning. NPO for an EGD today. No needs at present time. Call light within reach. Continue to monitor

## 2024-03-28 NOTE — CARE COORDINATION
Case Management Assessment  Initial Evaluation    Date/Time of Evaluation: 3/28/2024 2:10 PM  Assessment Completed by: Karena Hernandez MSW LSW     If patient is discharged prior to next notation, then this note serves as note for discharge by case management.    Patient Name: Keven Iyer                   YOB: 1955  Diagnosis: Intractable vomiting [R11.10]  Acute epigastric pain [R10.13]  Chest pain, unspecified type [R07.9]                   Date / Time: 3/26/2024  8:56 PM    Patient Admission Status: Inpatient   Readmission Risk (Low < 19, Mod (19-27), High > 27): Readmission Risk Score: 5.4    Current PCP: Vernon Vincent, DO  PCP verified by CM? Yes    Chart Reviewed: Yes      History Provided by: Patient, Medical Record  Patient Orientation: Alert and Oriented, Person, Place, Situation    Patient Cognition: Alert    Hospitalization in the last 30 days (Readmission):  No    If yes, Readmission Assessment in  Navigator will be completed.    Advance Directives:      Code Status: Full Code   Patient's Primary Decision Maker is: Legal Next of Kin (Pt reports that he has advance directives but not on file here)    Primary Decision Maker: Luz Iyer - Spouse - [phone]    Discharge Planning:    Patient lives with: Spouse/Significant Other Type of Home: House  Primary Care Giver: Self  Patient Support Systems include: Spouse/Significant Other   Current Financial resources: Medicare  Current community resources: None  Current services prior to admission: None            Current DME:  None            Type of Home Care services:  None    ADLS  Prior functional level: Independent in ADLs/IADLs  Current functional level: Independent in ADLs/IADLs    PT AM-PAC:   /24  OT AM-PAC:   /24    Family can provide assistance at DC: Yes  Would you like Case Management to discuss the discharge plan with any other family members/significant others, and if so, who? Yes (Spouse present in room)  Plans to

## 2024-03-28 NOTE — H&P (VIEW-ONLY)
Gastroenterology Consult Note    Patient:   Keven Iyer   Admit date:  3/26/2024  Facility:   [facility]  Referring/PCP: Vernon Vincent DO  Date:     3/27/2024  Consultant:   YOSHI KIRK MD, MD    Subjective:     Keven Iyer is a 68 y.o. male was admitted 3/26/2024 with a diagnosis of \"Intractable vomiting [R11.10]  Acute epigastric pain [R10.13]  Chest pain, unspecified type [R07.9]\" and is seen in consultation regarding   Chief Complaint   Patient presents with    Abdominal Pain     Epigastric pain radiating up throat and then vomit. Has had 10-12 episodes within the last 2 hours. Started during dinner.    He reports that the dysphagia started. He denies any prior episodes and reports that he has not had any previous upper endoscopy. He also states that he has not had a colonoscopy. He denies any hematemesis or hematochezia. He denies any recent NSAID use and also denies any weight loss. His presenting hemoglobin was 14.1.       Past Medical History:  Past Medical History:   Diagnosis Date    CVA (cerebral vascular accident) (HCC)     Diabetes mellitus (HCC)     Hypertension        Past Surgical History:  Past Surgical History:   Procedure Laterality Date    CAROTID ENDARTERECTOMY         Social History:  Social History     Tobacco Use    Smoking status: Former     Types: Cigarettes    Smokeless tobacco: Never   Substance Use Topics    Alcohol use: Yes     Comment: occ.       Family History:   History reviewed. No pertinent family history.    Diet:  Diet NPO    Scheduled Medications:    [Held by provider] aspirin  81 mg Oral Daily    [Held by provider] atorvastatin  80 mg Oral Daily    [MAR Hold] hydroCHLOROthiazide  25 mg Oral Daily    [MAR Hold] lisinopril  10 mg Oral Daily    [Held by provider] metFORMIN  1,000 mg Oral BID WC    [MAR Hold] sodium chloride flush  10 mL IntraVENous 2 times per day    [Held by provider] enoxaparin  40 mg SubCUTAneous Daily    [Held by provider]

## 2024-03-28 NOTE — FLOWSHEET NOTE
Sitting up in bed eating a late lunch. Denies nausea or difficulty swallowing. No needs at present time.

## 2024-03-28 NOTE — PLAN OF CARE
Problem: Discharge Planning  Goal: Discharge to home or other facility with appropriate resources  3/27/2024 2156 by Amberly Valerio, RN  Outcome: Progressing     Problem: Chronic Conditions and Co-morbidities  Goal: Patient's chronic conditions and co-morbidity symptoms are monitored and maintained or improved  3/27/2024 2156 by Amberly Valerio, RN  Outcome: Progressing

## 2024-03-28 NOTE — ANESTHESIA POSTPROCEDURE EVALUATION
Department of Anesthesiology  Postprocedure Note    Patient: Keven Iyer  MRN: [id number]  YOB: 1955  Date of evaluation: 3/28/2024    Procedure Summary       Date: 03/28/24 Room / Location: [place] [address] / [facility]    Anesthesia Start: 1031 Anesthesia Stop: 1050    Procedure: ESOPHAGOGASTRODUODENOSCOPY BIOPSY Diagnosis:       Dysphagia, unspecified type      (Dysphagia, unspecified type [R13.10])    Surgeons: Mahsa Colon MD Responsible Provider: Amberly Webb APRN - CRNA    Anesthesia Type: TIVA, MAC ASA Status: 4            Anesthesia Type: TIVA, MAC    Brittanie Phase I: Brittanie Score: 9    Brittanie Phase II: Brittanie Score: 10    Anesthesia Post Evaluation    Patient location during evaluation: bedside  Patient participation: complete - patient participated  Level of consciousness: awake and alert  Pain score: 0  Airway patency: patent  Nausea & Vomiting: no nausea and no vomiting  Cardiovascular status: hemodynamically stable  Respiratory status: acceptable  Hydration status: stable  Pain management: adequate        No notable events documented.

## 2024-03-28 NOTE — INTERVAL H&P NOTE
Update History & Physical    The patient's History and Physical of March 27, 2024 was reviewed with the patient and I examined the patient. There was no change. The surgical site was confirmed by the patient and me.     Plan: The risks, benefits, expected outcome, and alternative to the recommended procedure have been discussed with the patient. Patient understands and wants to proceed with the procedure.     Electronically signed by YOSHI KIRK MD on 3/28/2024 at 10:17 AM

## 2024-03-28 NOTE — DISCHARGE SUMMARY
Discharge Summary    Keven Iyer  :  1955  MRN:  [id number]    Admit date:  3/26/2024      Discharge date: 3/28/2024     Admitting Physician:  Adelso Guzman MD    Discharge Diagnoses:    Principal Problem:    Acute epigastric pain  Active Problems:    Hypertension    Type 2 diabetes mellitus without complication, without long-term current use of insulin (HCC)    Duodenitis    Esophagitis  Resolved Problems:    * No resolved hospital problems. *      Hospital Course:   Keven Iyer is a 68 y.o. male admitted with dysphagia.  He presented with complaints of vomiting and epigastric pain.  He reported this began after having dinner.  Pain radiated into his throat and began vomiting.  He reported 10-12 episodes of vomiting.  He complained of hiccups.  He reported clear vomit with occasional undigested food.  He denied diarrhea constipation.  He denied weight loss.  He denied chest pain or shortness of breath.  He denied fever or chills.  He does occasionally have gastric reflux but reported this felt different than his baseline.  He does have history of hypertension, diabetes, CVA, carotid endarterectomy.  Troponins were 9 and 10.  He had no leukocytosis on his labs.  EKG showed sinus rhythm without ST or T wave changes.  CT abdomen and pelvis showed no acute intra-abdominal or pelvic process and a small hiatal hernia.  Patient was given IV Protonix, Zofran and a GI cocktail in the emergency room without much improvement.  Upon admission GI was consulted for possible EGD.  CT chest which was completed and patient was found to have a distended esophagus containing fluid and debris without appreciable area of narrowing in the esophagus or GE junction.  Etiology was unclear and recommended EGD.  Patient was also found to have a right adrenal gland mass concerning for pheochromocytoma.  MRI was completed at that time and patient was found to have a 3.3 x 3.4 x 2.6 cm right adrenal mass concerning for

## 2024-03-28 NOTE — PLAN OF CARE
Problem: Discharge Planning  Goal: Discharge to home or other facility with appropriate resources  Outcome: Adequate for Discharge     Problem: Chronic Conditions and Co-morbidities  Goal: Patient's chronic conditions and co-morbidity symptoms are monitored and maintained or improved  Outcome: Adequate for Discharge     Problem: Nutrition Deficit:  Goal: Optimize nutritional status  Outcome: Adequate for Discharge     Problem: Pain  Goal: Verbalizes/displays adequate comfort level or baseline comfort level  Outcome: Adequate for Discharge

## 2024-03-28 NOTE — OP NOTE
[facility]    EGD    PROCEDURE DATE: 03/28/24    REFERRING PHYSICIAN: No ref. provider found     PRIMARY CARE PROVIDER: Vernon Vincent DO    ATTENDING PHYSICIAN: YOSHI KIRK MD     HISTORY: Mr. Keven Iyer is a 68 y.o. male who presents to the  Endoscopy unit for upper endoscopy. The patient's clinical history is remarkable for DM II. He is currently medically stable and appropriate for the planned procedure.       PREOPERATIVE DIAGNOSIS: Dysphagia.     PROCEDURES:   1) Transoral Upper Endoscopy.     POSTOPERATIVE DIAGNOSIS: Esophagitis, gastritis     MEDICATIONS:   MAC per anesthesia      EBL:  2 ml    INSTRUMENT: Olympus GIF-H190  flexible Gastroscope.     PREPARATION: The nature and character of the procedure as well as risks, benefits, and alternatives were discussed with the patient and informed consent was obtained. Complications were said to include, but were not limited to: medication allergy, medication reaction, cardiovascular and respiratory problems, bleeding, perforation, infection, and/or missed diagnosis. Following arrival in the endoscopy room, the patient was placed in the left lateral decubitus position and final time-out accomplished in the presence of the nursing staff. Baseline vital signs were obtained and reviewed, and IV sedation was subsequently initiated.     FINDINGS:   Esophagus: The esophagus was inspected to the Z-line. The endoscopic exam showed edematous and erythematous GEJ at 41 cm from the incisor with no ulcerations.     Stomach: The stomach was inspected in both forward and retroflex fashion and was appropriately distensible. The cardia, fundus, incisura, antrum and pylorus were identified via direct visualization. The endoscopic exam showed erosions in the gastric antrum with no bleeding ulcers and no ulcers on incisura on retroflex.     Duodenum: The proximal small bowel was inspected through the bulb, sweep, and second portion of the duodenum. The

## 2024-03-28 NOTE — CONSULTS
Gastroenterology Consult Note    Patient:   Keevn Iyer   Admit date:  3/26/2024  Facility:   [facility]  Referring/PCP: Vernon Vincent DO  Date:     3/27/2024  Consultant:   YOSHI KIRK MD, MD    Subjective:     Keven Iyer is a 68 y.o. male was admitted 3/26/2024 with a diagnosis of \"Intractable vomiting [R11.10]  Acute epigastric pain [R10.13]  Chest pain, unspecified type [R07.9]\" and is seen in consultation regarding   Chief Complaint   Patient presents with    Abdominal Pain     Epigastric pain radiating up throat and then vomit. Has had 10-12 episodes within the last 2 hours. Started during dinner.    He reports that the dysphagia started. He denies any prior episodes and reports that he has not had any previous upper endoscopy. He also states that he has not had a colonoscopy. He denies any hematemesis or hematochezia. He denies any recent NSAID use and also denies any weight loss. His presenting hemoglobin was 14.1.       Past Medical History:  Past Medical History:   Diagnosis Date    CVA (cerebral vascular accident) (HCC)     Diabetes mellitus (HCC)     Hypertension        Past Surgical History:  Past Surgical History:   Procedure Laterality Date    CAROTID ENDARTERECTOMY         Social History:  Social History     Tobacco Use    Smoking status: Former     Types: Cigarettes    Smokeless tobacco: Never   Substance Use Topics    Alcohol use: Yes     Comment: occ.       Family History:   History reviewed. No pertinent family history.    Diet:  Diet NPO    Scheduled Medications:    [Held by provider] aspirin  81 mg Oral Daily    [Held by provider] atorvastatin  80 mg Oral Daily    [MAR Hold] hydroCHLOROthiazide  25 mg Oral Daily    [MAR Hold] lisinopril  10 mg Oral Daily    [Held by provider] metFORMIN  1,000 mg Oral BID WC    [MAR Hold] sodium chloride flush  10 mL IntraVENous 2 times per day    [Held by provider] enoxaparin  40 mg SubCUTAneous Daily    [Held by provider]

## 2024-03-28 NOTE — FLOWSHEET NOTE
Returned from EGD per stretcher. Up to bathroom with assist.to bed. Vitals checked and assessment completed. Denies pain. Order for clear liquid diet. Patient wants to stat with ice chip. Call light within reach. Continue to monitor

## 2024-03-28 NOTE — PROGRESS NOTES
Comprehensive Nutrition Assessment    Type and Reason for Visit:  Initial    Nutrition Recommendations/Plan:   Resume diet as GI feasible.      Malnutrition Assessment:  Malnutrition Status:  No malnutrition (03/27/24 1118)    Context:  Acute Illness     Findings of the 6 clinical characteristics of malnutrition:  Energy Intake:  Mild decrease in energy intake (Comment) (24 hours)  Weight Loss:  No significant weight loss     Body Fat Loss:  No significant body fat loss     Muscle Mass Loss:  No significant muscle mass loss    Fluid Accumulation:  No significant fluid accumulation     Strength:  Not Performed    Nutrition Assessment:    Inadequate nutrient intakes r/t altered GI status, AEB emesis with pending EGD.  Known diabetes which is under control per stated A1C 6.6 at Dr. Vincent office. No usual ingestion concerns and casually monitors diet per interview.  Does avoid salt r/t HTN. No deficits for chew or swallow with CVA history.    Nutrition Related Findings:    no malnutrition indices. no edema. + emesis. Wound Type: None       Current Nutrition Intake & Therapies:    Average Meal Intake: NPO  Average Supplements Intake: NPO  Diet NPO    Anthropometric Measures:  Height: 165.1 cm (5' 5\")  Ideal Body Weight (IBW): 136 lbs (62 kg)    Admission Body Weight: 66.2 kg (146 lb)  Current Body Weight: 71 kg (156 lb 9.6 oz), 115.1 % IBW. Weight Source: Bed Scale  Current BMI (kg/m2): 26.1  Usual Body Weight: 67.1 kg (148 lb) (upper end of normal at home)  % Weight Change (Calculated): 5.8  Weight Adjustment For: No Adjustment                 BMI Categories: Overweight (BMI 25.0-29.9)    Estimated Daily Nutrient Needs:  Energy Requirements Based On: Kcal/kg  Weight Used for Energy Requirements: Current  Energy (kcal/day): 8219-8852 (22-27)  Weight Used for Protein Requirements: Ideal  Protein (g/day): 74-86 (1.2-1.4)  Method Used for Fluid Requirements: 1 ml/kcal  Fluid (ml/day): 1900    Nutrition Diagnosis: 
Discharge Criteria    Inpatients must meet Criteria 1 through 7. All other patients are either YES or N/A. If a NO is chosen then Anesthesia or Surgeon must be notified.      1.  Minimum 30 minutes after last dose of sedative medication.    Yes      2.  Systolic BP between 90 - 160. Diastolic BP between 60 - 90.    Yes      3.  Pulse between 60 - 120    Yes      4.  Respirations between 8 - 25.    Yes      5.  SpO2 92% - 100%.    Yes      6.  Able to cough and swallow or return to baseline function.    Yes      7.  Alert and oriented or return to baseline mental status.    Yes      8.  Demonstrates controlled, coordinated movements, ambulates with steady gait, or return to baseline activity function.    N/A      9.  Minimal or no pain or nausea, or at a level tolerable and acceptable to patient.    N/A      10. Takes and retains oral fluids as allowed.    N/A      11. Procedural / perioperative site stable.  Minimal or no bleeding.    N/A          12. If GI endoscopy procedure, minimal or no abdominal distention or passing flatus.    Yes      13. Written discharge instructions and emergency telephone number provided.    N/A      14. Accompanied by a responsible adult.    N/A              
Dr Colon paged to room  
Dr Isaiah pollack side speaking to pt and wife.  
Progress Note    SUBJECTIVE:    Patient seen for f/u of Acute epigastric pain.  He resting in bed with visitor at side.  Vomits after trying to swallow.   No weight loss    ROS:   Constitutional: negative  for fevers, and negative for chills.  Respiratory: negative for shortness of breath, negative for cough, and negative for wheezing  Cardiovascular: negative for chest pain, and negative for palpitations  Gastrointestinal: negative for abdominal pain, positive for nausea,positive for vomiting, negative for diarrhea, and negative for constipation     All other systems were reviewed with the patient and are negative unless otherwise stated in HPI      OBJECTIVE:      Vitals:   Vitals:    03/27/24 0714   BP: (!) 154/68   Pulse: 64   Resp: 18   Temp: 97.2 °F (36.2 °C)   SpO2: 98%     Weight - Scale: 71 kg (156 lb 9.6 oz)   Height: 165.1 cm (5' 5\")     Weight  Wt Readings from Last 3 Encounters:   03/27/24 71 kg (156 lb 9.6 oz)     Body mass index is 26.06 kg/m².    24HR INTAKE/OUTPUT:    No intake or output data in the 24 hours ending 03/27/24 0931  -----------------------------------------------------------------  Exam:    GEN:    Awake, alert and oriented x3.   EYES:  EOMI, pupils equal   NECK: Supple. No lymphadenopathy.  No carotid bruit  CVS:    regular rate and rhythm, no audible murmur  PULM:  CTA, no wheezes, rales or rhonchi, no acute respiratory distress  ABD:    Bowels sounds normal.  Abdomen is soft.  No distention.  no tenderness to palpation.   EXT:   no edema bilaterally .  No calf tenderness.   NEURO: Moves all extremities.  Motor and sensory are grossly intact  SKIN:  No rashes.  No skin lesions.    -----------------------------------------------------------------    Diagnostic Data:      Complete Blood Count:   Recent Labs     03/26/24  2103 03/27/24  0540   WBC 5.0 7.4   RBC 4.93 4.35   HGB 14.1 12.5*   HCT 42.8 37.9*   MCV 86.8 87.1   MCH 28.6 28.7   MCHC 32.9 33.0   RDW 12.9 13.0    312 
[facility] Pt able to ambulate from chair to bed without assistance. Vitals and assessment completed as charted. Complains of nausea, hiccupping and heartburn. Pt denies any current needs at this time. Call light is within reach, family at bedside. Care ongoing.  
Pt's room cleaned and organized. Trash and linen changed at this time. Pt denies any needs from writer at this time. Call light within reach. Care is ongoing.  
Reviewed discharge instructions with patient and spouse.  Patient aware of new medications.  Spouse already pick medications up from [facility].  Reviewed new medications and side effects to monitor for.  Patient aware of date/time of follow up appointments.  Aware of need to get PET scan.  Provided patient with scheduling phone number to schedule PET scan.  Instructed to eat a regular diet but to avoid foods that make symptoms worse such as chocolate, mint, alcohol, pepper, spicy foods, high-fat foods, or drinks with caffeine such as tea, coffee, betty or energy drinks.  Reviewed EGD instructions.  Educational handouts given on Esophagitis, Gastritis, Carafate, and NSAID's.  Questions answered.  Verbalizes understanding.  Copy of discharge instructions given to patient.  
Spiritual Services Interventions  0312/0312-01   3/28/2024  Dylan Iyer  68 y.o. year old male    Encounter Summary  Encounter Overview/Reason : (P) Initial Encounter  Service Provided For:: (P) Patient  Referral/Consult From:: (P) Rounding  Last Encounter : (P) 03/28/24  Complexity of Encounter: (P) Moderate  Begin Time: (P) 1300  End Time : (P) 1315  Total Time Calculated: (P) 15 min     Spiritual/Emotional needs  Type: (P) Spiritual Support                    Assessment/Intervention/Outcome  Assessment: (P) Calm  Intervention: (P) Discussed belief system/Zoroastrianism practices/ashlyn, Discussed illness injury and it’s impact  Outcome: (P) Encouraged, Engaged in conversation     
Writer at bedside to complete evening assessment. Upon entry to room, pt awake and in bed, respirations normal and unlabored while on room air. Vitals obtained and assessment completed, see flow sheet for details. Pt denies needs from writer at this time. Call light in reach. Care is ongoing.     
  RBC 4.93 4.35 4.27   HGB 14.1 12.5* 12.3*   HCT 42.8 37.9* 37.2*   MCV 86.8 87.1 87.1   MCH 28.6 28.7 28.8   MCHC 32.9 33.0 33.1   RDW 12.9 13.0 12.9    312 308   MPV 9.7 9.7 9.4        Last 3 Blood Glucose:   Recent Labs     03/26/24 2103 03/27/24  0540 03/28/24  0533   GLUCOSE 128* 121* 101*        Comprehensive Metabolic Profile:   Recent Labs     03/26/24 2103 03/27/24  0540 03/28/24  0533    140 139   K 4.0 3.8 4.0   CL 97* 104 104   CO2 28 24 22   BUN 14 11 11   CREATININE 0.8 0.7 0.7   GLUCOSE 128* 121* 101*   CALCIUM 9.7 8.6 8.6   PROT 7.9  --   --    LABALBU 4.8  --   --    BILITOT 1.0  --   --    ALKPHOS 67  --   --    AST 22  --   --    ALT 27  --   --         Urinalysis: No results found for: \"NITRU\", \"COLORU\", \"PHUR\", \"LABCAST\", \"WBCUA\", \"RBCUA\", \"MUCUS\", \"TRICHOMONAS\", \"YEAST\", \"BACTERIA\", \"CLARITYU\", \"SPECGRAV\", \"LEUKOCYTESUR\", \"UROBILINOGEN\", \"BILIRUBINUR\", \"BLOODU\", \"GLUCOSEU\", \"KETUA\", \"AMORPHOUS\"    HgBA1c:  No results found for: \"LABA1C\"    Lactic Acid:   Lab Results   Component Value Date/Time    LACTA 1.4 03/26/2024 09:16 PM        Troponin: No results for input(s): \"TROPONINI\" in the last 72 hours.    CRP:  No results for input(s): \"CRP\" in the last 72 hours.      Radiology/Imaging:  MRI ABDOMEN W WO CONTRAST   Final Result   1. 3.3 x 3.4 x 2.6 cm right adrenal mass, with heterogeneous architecture and   calcification.  The finding is highly suspicious for adrenal gland neoplasm.   PET-CT scan or tissue sampling is recommended for further evaluation.  There   are no evidence there is no intrinsic fat to suggest adenoma.   2. Multiple T2 hyperintense foci in the right lobe of the liver with arterial   phase hyperenhancement without washout. These findings may represent flash   filling hemangiomas or FNH. Follow-up MRI in six months is recommended to   confirm stability.   3. Small hiatal hernia.         CT CHEST W CONTRAST   Final Result   Distended esophagus containing fluid

## 2024-03-28 NOTE — FLOWSHEET NOTE
Tolerating a clear liquid diet with no c/o pain or nausea. Increased to a soft GI diet. Patient aware of discharge order. Will try a regular diet and discharge if tolerates .

## 2024-03-28 NOTE — ANESTHESIA PRE PROCEDURE
Department of Anesthesiology  Preprocedure Note       Name:  Keven Iyer   Age:  68 y.o.  :  1955                                          MRN:  [id number]         Date:  3/28/2024      Surgeon: Surgeon(s):  Mahsa Colon MD    Procedure: Procedure(s):  ESOPHAGOGASTRODUODENOSCOPY    Medications prior to admission:   Prior to Admission medications    Medication Sig Start Date End Date Taking? Authorizing Provider   hydroCHLOROthiazide (HYDRODIURIL) 25 MG tablet Take 1 tablet by mouth daily   Yes ProviderCynthia MD   metFORMIN (GLUCOPHAGE) 1000 MG tablet Take 1 tablet by mouth 2 times daily (with meals)   Yes Provider, MD Cynthia   lisinopril (PRINIVIL;ZESTRIL) 10 MG tablet Take 1 tablet by mouth daily   Yes ProviderCynthia MD   atorvastatin (LIPITOR) 80 MG tablet Take 1 tablet by mouth daily   Yes ProviderCynthia MD   aspirin 81 MG chewable tablet Take 1 tablet by mouth daily   Yes ProviderCynthia MD       Current medications:    Current Facility-Administered Medications   Medication Dose Route Frequency Provider Last Rate Last Admin    [Held by provider] aspirin chewable tablet 81 mg  81 mg Oral Daily Heydi Herron, APRN - CNP        [Held by provider] atorvastatin (LIPITOR) tablet 80 mg  80 mg Oral Daily Heydi Herron L, APRN - CNP        [MAR Hold] hydroCHLOROthiazide (HYDRODIURIL) tablet 25 mg  25 mg Oral Daily Heydi Herron L, APRN - CNP        [MAR Hold] lisinopril (PRINIVIL;ZESTRIL) tablet 10 mg  10 mg Oral Daily Heydi Herron L, APRN - CNP        [Held by provider] metFORMIN (GLUCOPHAGE) tablet 1,000 mg  1,000 mg Oral BID WC Heydi Herron L, APRN - CNP        [MAR Hold] 0.9 % sodium chloride infusion   IntraVENous Continuous GermaineHeydi duron L, APRN - CNP 75 mL/hr at 24 1740 New Bag at 24 1740    [MAR Hold] sodium chloride flush 0.9 % injection 10 mL  10 mL IntraVENous 2 times per day Heydi Herron, APRN - CNP   10 mL at 24 0814    [MAR Hold] sodium chloride

## 2024-04-01 ENCOUNTER — OFFICE VISIT (OUTPATIENT)
Dept: ONCOLOGY | Age: 69
End: 2024-04-01
Payer: MEDICARE

## 2024-04-01 ENCOUNTER — HOSPITAL ENCOUNTER (OUTPATIENT)
Age: 69
Discharge: HOME | End: 2024-04-01
Payer: MEDICARE

## 2024-04-01 VITALS
HEIGHT: 65 IN | DIASTOLIC BLOOD PRESSURE: 68 MMHG | SYSTOLIC BLOOD PRESSURE: 128 MMHG | RESPIRATION RATE: 18 BRPM | TEMPERATURE: 97.3 F | HEART RATE: 69 BPM | BODY MASS INDEX: 23.82 KG/M2 | WEIGHT: 143 LBS

## 2024-04-01 DIAGNOSIS — E27.8 ADRENAL MASS (HCC): ICD-10-CM

## 2024-04-01 DIAGNOSIS — E27.8 ADRENAL MASS (HCC): Primary | ICD-10-CM

## 2024-04-01 LAB — SURGICAL PATHOLOGY REPORT: NORMAL

## 2024-04-01 PROCEDURE — G8420 CALC BMI NORM PARAMETERS: HCPCS | Performed by: INTERNAL MEDICINE

## 2024-04-01 PROCEDURE — 36415 COLL VENOUS BLD VENIPUNCTURE: CPT

## 2024-04-01 PROCEDURE — 99205 OFFICE O/P NEW HI 60 MIN: CPT | Performed by: INTERNAL MEDICINE

## 2024-04-01 PROCEDURE — 99202 OFFICE O/P NEW SF 15 MIN: CPT | Performed by: INTERNAL MEDICINE

## 2024-04-01 PROCEDURE — 3078F DIAST BP <80 MM HG: CPT | Performed by: INTERNAL MEDICINE

## 2024-04-01 PROCEDURE — 82384 ASSAY THREE CATECHOLAMINES: CPT

## 2024-04-01 PROCEDURE — G8428 CUR MEDS NOT DOCUMENT: HCPCS | Performed by: INTERNAL MEDICINE

## 2024-04-01 PROCEDURE — 3074F SYST BP LT 130 MM HG: CPT | Performed by: INTERNAL MEDICINE

## 2024-04-01 NOTE — PROGRESS NOTES
history, performing a medically appropriate examination, counseling and educating the patient/family/caregiver and ordering medications, tests, or procedures.                                 Yonas Pollock MD                          [facility]                            This note is created with the assistance of a speech recognition program.  While intending to generate a document that actually reflects the content of the visit, the document can still have some errors including those of syntax and sound a like substitutions which may escape proof reading.  It such instances, actual meaning can be extrapolated by contextual diversion.

## 2024-04-05 ENCOUNTER — HOSPITAL ENCOUNTER (OUTPATIENT)
Age: 69
Discharge: HOME | End: 2024-04-05
Payer: MEDICARE

## 2024-04-05 DIAGNOSIS — E27.8 ADRENAL MASS (HCC): ICD-10-CM

## 2024-04-05 DIAGNOSIS — E27.8 ADRENAL MASS (HCC): Primary | ICD-10-CM

## 2024-04-05 PROCEDURE — 82384 ASSAY THREE CATECHOLAMINES: CPT

## 2024-04-05 PROCEDURE — 36415 COLL VENOUS BLD VENIPUNCTURE: CPT

## 2024-04-10 LAB
DOPAMINE SERPL-MCNC: 164 PMOL/L
EPINEPHRINE PLASMA: 85 PMOL/L
NOREPINEPHRINE: 8589 PMOL/L (ref 1050–4800)

## 2024-04-16 ENCOUNTER — TELEPHONE (OUTPATIENT)
Dept: GASTROENTEROLOGY | Age: 69
End: 2024-04-16

## 2024-04-16 NOTE — TELEPHONE ENCOUNTER
----- Message from Mahsa Colon MD sent at 4/16/2024  9:07 AM EDT -----  Please notify patient: No H. pylori infection, no celiac disease, no Posey's esophagus.  Esophagitis noted likely related to reflux.     Risk factors for worsening GERD including hiatal hernia, weight gain, medication noncompliance, fatty foods, coffee, alcohol, tomato-based foods, spicy foods, citrus, carbonated drinks, NSAID use, and eating just before bedtime are all known to cause a worsening of acid reflux despite use of proton pump inhibitors. These also represent modifiable risk factors of acid reflux.

## 2024-04-17 DIAGNOSIS — C79.71: Primary | ICD-10-CM

## 2024-04-17 DIAGNOSIS — C80.1: Primary | ICD-10-CM

## 2024-04-18 ENCOUNTER — HOSPITAL ENCOUNTER (OUTPATIENT)
Dept: PET IMAGING | Age: 69
Discharge: HOME | End: 2024-04-20
Payer: MEDICARE

## 2024-04-18 DIAGNOSIS — E27.8 ADRENAL MASS (HCC): ICD-10-CM

## 2024-04-18 PROCEDURE — 78815 PET IMAGE W/CT SKULL-THIGH: CPT

## 2024-04-18 PROCEDURE — 3430000000 HC RX DIAGNOSTIC RADIOPHARMACEUTICAL: Performed by: INTERNAL MEDICINE

## 2024-04-18 PROCEDURE — A9609 HC RX DIAGNOSTIC RADIOPHARMACEUTICAL: HCPCS | Performed by: INTERNAL MEDICINE

## 2024-04-18 RX ORDER — FLUDEOXYGLUCOSE F 18 200 MCI/ML
14 INJECTION, SOLUTION INTRAVENOUS
Status: COMPLETED | OUTPATIENT
Start: 2024-04-18 | End: 2024-04-18

## 2024-04-18 RX ADMIN — FLUDEOXYGLUCOSE F 18 14 MILLICURIE: 200 INJECTION, SOLUTION INTRAVENOUS at 12:31

## 2024-04-22 ENCOUNTER — OFFICE VISIT (OUTPATIENT)
Dept: ONCOLOGY | Age: 69
End: 2024-04-22
Payer: MEDICARE

## 2024-04-22 VITALS
HEART RATE: 62 BPM | SYSTOLIC BLOOD PRESSURE: 135 MMHG | HEIGHT: 65 IN | DIASTOLIC BLOOD PRESSURE: 52 MMHG | TEMPERATURE: 96.6 F | BODY MASS INDEX: 23.82 KG/M2 | WEIGHT: 143 LBS | RESPIRATION RATE: 18 BRPM

## 2024-04-22 DIAGNOSIS — E27.8 ADRENAL MASS (HCC): Primary | ICD-10-CM

## 2024-04-22 DIAGNOSIS — D35.01 PHEOCHROMOCYTOMA OF RIGHT ADRENAL GLAND: ICD-10-CM

## 2024-04-22 PROCEDURE — 99215 OFFICE O/P EST HI 40 MIN: CPT | Performed by: INTERNAL MEDICINE

## 2024-04-22 PROCEDURE — 1123F ACP DISCUSS/DSCN MKR DOCD: CPT | Performed by: INTERNAL MEDICINE

## 2024-04-22 PROCEDURE — G8420 CALC BMI NORM PARAMETERS: HCPCS | Performed by: INTERNAL MEDICINE

## 2024-04-22 PROCEDURE — 3078F DIAST BP <80 MM HG: CPT | Performed by: INTERNAL MEDICINE

## 2024-04-22 PROCEDURE — 1111F DSCHRG MED/CURRENT MED MERGE: CPT | Performed by: INTERNAL MEDICINE

## 2024-04-22 PROCEDURE — 3017F COLORECTAL CA SCREEN DOC REV: CPT | Performed by: INTERNAL MEDICINE

## 2024-04-22 PROCEDURE — 3075F SYST BP GE 130 - 139MM HG: CPT | Performed by: INTERNAL MEDICINE

## 2024-04-22 PROCEDURE — 99211 OFF/OP EST MAY X REQ PHY/QHP: CPT | Performed by: INTERNAL MEDICINE

## 2024-04-22 PROCEDURE — 1036F TOBACCO NON-USER: CPT | Performed by: INTERNAL MEDICINE

## 2024-04-22 PROCEDURE — G8427 DOCREV CUR MEDS BY ELIG CLIN: HCPCS | Performed by: INTERNAL MEDICINE

## 2024-04-22 NOTE — PROGRESS NOTES
some accumulation of radiotracer  in the distal stomach and proximal duodenum which should be correlated  clinically.  Findings may be related to inflammation.  Other etiology not  excluded as the duodenal wall appears prominent.    The patient's right adrenal mass demonstrates abnormal metabolic activity  with heterogeneity.  Combination of findings on PET-CT with MRI imaging  favors adrenal neoplasia.  No metabolically active intraperitoneal masses.  No metabolically active pelvic or inguinal adenopathy.    BONES/SOFT TISSUE: No abnormal radiotracer uptake in the bones or  subcutaneous soft tissues.  No metabolically active axillary adenopathy.    INCIDENTAL CT FINDINGS: Atherosclerosis of the aorta and coronary arteries.  Calcification within the right adrenal mass is noted.  Fairly extensive  colonic diverticulosis is present.  Wall thickening of the distal gastric  antrum and duodenum.  Impression: 1. Right adrenal mass demonstrates abnormal metabolic activity with  heterogeneity. Combination of findings on PET-CT with MRI imaging favors  adrenal neoplasia.  SUV maximum 9.69.  2. Incidentally noted is some accumulation of radiotracer in the distal  stomach and proximal duodenum, SUV maximum 8.5 which should be correlated  clinically. Findings may be related to inflammation. Other etiology not  excluded as the duodenal wall appears prominent.  3. Fairly extensive colonic diverticulosis.  4. Atherosclerosis of the aorta and coronary arteries.    RECOMMENDATIONS:  Advise biopsy of the right adrenal gland.  Consider endoscopy with attention  to the distal stomach and duodenum.       ASSESSMENT:       Diagnosis Orders   1. Adrenal mass (HCC)  Referral to Interventional Radiology      2. Pheochromocytoma of right adrenal gland  Referral to Interventional Radiology    Fahad Burdick MD, Cardiology, [place]                 PLAN:     I personally reviewed results of lab work-up imaging studies,outside records and

## 2024-04-25 DIAGNOSIS — D35.01 PHEOCHROMOCYTOMA OF RIGHT ADRENAL GLAND: ICD-10-CM

## 2024-04-25 DIAGNOSIS — E27.8 ADRENAL MASS (HCC): Primary | ICD-10-CM

## 2024-05-17 ENCOUNTER — OFFICE VISIT (OUTPATIENT)
Dept: CARDIOLOGY | Age: 69
End: 2024-05-17
Payer: MEDICARE

## 2024-05-17 VITALS
OXYGEN SATURATION: 96 % | DIASTOLIC BLOOD PRESSURE: 62 MMHG | HEART RATE: 55 BPM | RESPIRATION RATE: 18 BRPM | BODY MASS INDEX: 24.03 KG/M2 | WEIGHT: 144.2 LBS | SYSTOLIC BLOOD PRESSURE: 133 MMHG | HEIGHT: 65 IN

## 2024-05-17 DIAGNOSIS — Z01.810 PREOP CARDIOVASCULAR EXAM: Primary | ICD-10-CM

## 2024-05-17 DIAGNOSIS — Z87.891 FORMER SMOKER: ICD-10-CM

## 2024-05-17 DIAGNOSIS — I10 PRIMARY HYPERTENSION: ICD-10-CM

## 2024-05-17 DIAGNOSIS — Z86.73 HISTORY OF STROKE: ICD-10-CM

## 2024-05-17 DIAGNOSIS — E78.2 MIXED HYPERLIPIDEMIA: ICD-10-CM

## 2024-05-17 DIAGNOSIS — E27.8 ADRENAL MASS (HCC): ICD-10-CM

## 2024-05-17 DIAGNOSIS — R94.31 ABNORMAL ELECTROCARDIOGRAPHY: ICD-10-CM

## 2024-05-17 DIAGNOSIS — R09.89 BRUIT OF LEFT CAROTID ARTERY: ICD-10-CM

## 2024-05-17 DIAGNOSIS — E11.9 TYPE 2 DIABETES MELLITUS WITHOUT COMPLICATION, WITHOUT LONG-TERM CURRENT USE OF INSULIN (HCC): ICD-10-CM

## 2024-05-17 DIAGNOSIS — I25.10 CORONARY ARTERY CALCIFICATION SEEN ON CT SCAN: ICD-10-CM

## 2024-05-17 PROCEDURE — 99211 OFF/OP EST MAY X REQ PHY/QHP: CPT | Performed by: PHYSICIAN ASSISTANT

## 2024-05-17 PROCEDURE — 2022F DILAT RTA XM EVC RTNOPTHY: CPT | Performed by: PHYSICIAN ASSISTANT

## 2024-05-17 PROCEDURE — 3075F SYST BP GE 130 - 139MM HG: CPT | Performed by: PHYSICIAN ASSISTANT

## 2024-05-17 PROCEDURE — 3078F DIAST BP <80 MM HG: CPT | Performed by: PHYSICIAN ASSISTANT

## 2024-05-17 PROCEDURE — 93005 ELECTROCARDIOGRAM TRACING: CPT | Performed by: PHYSICIAN ASSISTANT

## 2024-05-17 PROCEDURE — 3017F COLORECTAL CA SCREEN DOC REV: CPT | Performed by: PHYSICIAN ASSISTANT

## 2024-05-17 PROCEDURE — G8420 CALC BMI NORM PARAMETERS: HCPCS | Performed by: PHYSICIAN ASSISTANT

## 2024-05-17 PROCEDURE — 99204 OFFICE O/P NEW MOD 45 MIN: CPT | Performed by: PHYSICIAN ASSISTANT

## 2024-05-17 PROCEDURE — 93010 ELECTROCARDIOGRAM REPORT: CPT | Performed by: PHYSICIAN ASSISTANT

## 2024-05-17 PROCEDURE — 1123F ACP DISCUSS/DSCN MKR DOCD: CPT | Performed by: PHYSICIAN ASSISTANT

## 2024-05-17 PROCEDURE — G8427 DOCREV CUR MEDS BY ELIG CLIN: HCPCS | Performed by: PHYSICIAN ASSISTANT

## 2024-05-17 PROCEDURE — 3046F HEMOGLOBIN A1C LEVEL >9.0%: CPT | Performed by: PHYSICIAN ASSISTANT

## 2024-05-17 PROCEDURE — 1036F TOBACCO NON-USER: CPT | Performed by: PHYSICIAN ASSISTANT

## 2024-05-17 RX ORDER — ASPIRIN 81 MG/1
81 TABLET, CHEWABLE ORAL DAILY
COMMUNITY

## 2024-05-17 RX ORDER — SILDENAFIL 100 MG/1
100 TABLET, FILM COATED ORAL PRN
COMMUNITY

## 2024-05-17 NOTE — PROGRESS NOTES
(intraperitoneal, intrathoracic, HENT, orthopedic, or carotid endarterectomy, etc.)  Revised Cardiac Risk Index Risk factors: None  Measurement of Exercise Tolerance before Surgery >4 Yes  According to the 2014 ACC/AHA pre-operative risk assessment guidelines Keven A Jaylon is at low risk for major cardiac complications during a intermediate risk procedure and requires further evaluation. Specific medication recommendations are listed below. Medications recommended to continue should be taken with a sip of water even when NPO.     Medical management to reduce perioperative risk:  Antiplatelet Agent: I would prefer that his aspirin 81 mg be continued throughout the perioperative period but if bleeding risk is to high, this can be stopped 5-7 days prior to the planned procedure but please restart this as soon as safely possible.     Anticoagulant Agent: Not applicable prior to the procedure.   Anticoagulation Bridging: Not applicable  Additional Recommendations: I would also suggest that he continue his statin throughout the perioperative period.  Additional Testing List: Because of current signs and symptoms which can certainly be caused by significant coronary artery disease, I ordered a treadmill stress test with SPECT imaging to try and rule out this possibility.     Abnormal EKG 3/27/2024: Normal sinus rhythm, Left axis deviation, Inferior infarct (cited on or before 26-MAR-2024). Cannot rule out Anterior infarct , age undetermined  Will proceed with stress test as listed above  EKG done in office today no acute ischemic changes.    Atherosclerotic Heart Disease seen on Chest CT 3/27/2024:  Antiplatelet Agent: Continue Aspirin 81 mg daily.   Beta Blocker: Not indicated at this time.   Anti-anginal medications: Not indicated at this time.  Cholesterol Reduction Therapy: Continue Atorvastatin (Lipitor) 80 mg daily.    Additional counseling: I advised them to call our office or go to the emergency room if they

## 2024-05-20 ENCOUNTER — HOSPITAL ENCOUNTER (OUTPATIENT)
Dept: NUCLEAR MEDICINE | Age: 69
Discharge: HOME | End: 2024-05-22
Attending: INTERNAL MEDICINE
Payer: MEDICARE

## 2024-05-20 DIAGNOSIS — D35.01 PHEOCHROMOCYTOMA OF RIGHT ADRENAL GLAND: ICD-10-CM

## 2024-05-20 DIAGNOSIS — E27.8 ADRENAL MASS (HCC): ICD-10-CM

## 2024-05-20 PROCEDURE — A9592 HC RX DIAGNOSTIC RADIOPHARMACEUTICAL: HCPCS | Performed by: INTERNAL MEDICINE

## 2024-05-20 PROCEDURE — 3430000000 HC RX DIAGNOSTIC RADIOPHARMACEUTICAL: Performed by: INTERNAL MEDICINE

## 2024-05-20 PROCEDURE — 78815 PET IMAGE W/CT SKULL-THIGH: CPT

## 2024-05-20 PROCEDURE — 2580000003 HC RX 258: Performed by: INTERNAL MEDICINE

## 2024-05-20 RX ORDER — SODIUM CHLORIDE 0.9 % (FLUSH) 0.9 %
10 SYRINGE (ML) INJECTION ONCE
Status: COMPLETED | OUTPATIENT
Start: 2024-05-20 | End: 2024-05-20

## 2024-05-20 RX ADMIN — COPPER CU 64 DOTATATE 4.37 MILLICURIE: 1 INJECTION, SOLUTION INTRAVENOUS at 14:31

## 2024-05-20 RX ADMIN — SODIUM CHLORIDE, PRESERVATIVE FREE 10 ML: 5 INJECTION INTRAVENOUS at 14:31

## 2024-05-23 ENCOUNTER — HOSPITAL ENCOUNTER (OUTPATIENT)
Age: 69
Discharge: HOME | End: 2024-05-25
Payer: MEDICARE

## 2024-05-23 ENCOUNTER — HOSPITAL ENCOUNTER (OUTPATIENT)
Dept: NUCLEAR MEDICINE | Age: 69
Discharge: HOME | End: 2024-05-25
Payer: MEDICARE

## 2024-05-23 DIAGNOSIS — R94.31 ABNORMAL ELECTROCARDIOGRAPHY: ICD-10-CM

## 2024-05-23 LAB
NUC STRESS EJECTION FRACTION: 73 %
STRESS BASELINE DIAS BP: 72 MMHG
STRESS BASELINE HR: 55 BPM
STRESS BASELINE ST DEPRESSION: 0 MM
STRESS BASELINE SYS BP: 136 MMHG
STRESS ESTIMATED WORKLOAD: 10.3 METS
STRESS EXERCISE DUR MIN: 9 MIN
STRESS EXERCISE DUR SEC: 8 SEC
STRESS PEAK DIAS BP: 80 MMHG
STRESS PEAK SYS BP: 144 MMHG
STRESS PERCENT HR ACHIEVED: 103 %
STRESS POST PEAK HR: 157 BPM
STRESS RATE PRESSURE PRODUCT: NORMAL BPM*MMHG
STRESS STAGE 1 BP: NORMAL MMHG
STRESS STAGE 1 DURATION: 3 MIN:SEC
STRESS STAGE 1 HR: 86 BPM
STRESS STAGE 2 DURATION: 6 MIN:SEC
STRESS STAGE 2 HR: 116 BPM
STRESS STAGE 3 DURATION: 9 MIN:SEC
STRESS STAGE 3 HR: 134 BPM
STRESS STAGE RECOVERY 1 BP: NORMAL MMHG
STRESS STAGE RECOVERY 1 DURATION: 0 MIN:SEC
STRESS STAGE RECOVERY 1 HR: 157 BPM
STRESS STAGE RECOVERY 2 DURATION: 1 MIN:SEC
STRESS STAGE RECOVERY 2 HR: 117 BPM
STRESS STAGE RECOVERY 3 DURATION: 3 MIN:SEC
STRESS STAGE RECOVERY 3 HR: 86 BPM
STRESS STAGE RECOVERY 4 BP: NORMAL MMHG
STRESS STAGE RECOVERY 4 DURATION: 5 MIN:SEC
STRESS STAGE RECOVERY 4 HR: 69 BPM
STRESS TARGET HR: 153 BPM
TID: 1.11

## 2024-05-23 PROCEDURE — A9500 TC99M SESTAMIBI: HCPCS | Performed by: PHYSICIAN ASSISTANT

## 2024-05-23 PROCEDURE — 3430000000 HC RX DIAGNOSTIC RADIOPHARMACEUTICAL: Performed by: PHYSICIAN ASSISTANT

## 2024-05-23 PROCEDURE — 93017 CV STRESS TEST TRACING ONLY: CPT

## 2024-05-23 RX ORDER — TETRAKIS(2-METHOXYISOBUTYLISOCYANIDE)COPPER(I) TETRAFLUOROBORATE 1 MG/ML
30 INJECTION, POWDER, LYOPHILIZED, FOR SOLUTION INTRAVENOUS
Status: COMPLETED | OUTPATIENT
Start: 2024-05-23 | End: 2024-05-23

## 2024-05-23 RX ORDER — TETRAKIS(2-METHOXYISOBUTYLISOCYANIDE)COPPER(I) TETRAFLUOROBORATE 1 MG/ML
10 INJECTION, POWDER, LYOPHILIZED, FOR SOLUTION INTRAVENOUS
Status: COMPLETED | OUTPATIENT
Start: 2024-05-23 | End: 2024-05-23

## 2024-05-23 RX ADMIN — Medication 30 MILLICURIE: at 11:55

## 2024-05-23 RX ADMIN — Medication 10 MILLICURIE: at 10:20

## 2024-05-24 ENCOUNTER — TELEPHONE (OUTPATIENT)
Dept: CARDIOLOGY | Age: 69
End: 2024-05-24

## 2024-05-24 NOTE — TELEPHONE ENCOUNTER
----- Message from Aide Bautista PA-C sent at 5/23/2024 11:00 PM EDT -----  Please let them know their stress test looked good, it was low risk. Will discuss at follow up. Thanks.

## 2024-06-18 ENCOUNTER — APPOINTMENT (OUTPATIENT)
Dept: CT IMAGING | Age: 69
End: 2024-06-18
Payer: MEDICARE

## 2024-06-18 ENCOUNTER — HOSPITAL ENCOUNTER (EMERGENCY)
Age: 69
Discharge: ANOTHER HOSPITAL | End: 2024-06-19
Payer: MEDICARE

## 2024-06-18 ENCOUNTER — APPOINTMENT (OUTPATIENT)
Dept: GENERAL RADIOLOGY | Age: 69
End: 2024-06-18
Payer: MEDICARE

## 2024-06-18 DIAGNOSIS — H34.232 RETINAL ARTERY OCCLUSION, BRANCH, LEFT: Primary | ICD-10-CM

## 2024-06-18 DIAGNOSIS — I65.21 INTERNAL CAROTID ARTERY STENOSIS, RIGHT: ICD-10-CM

## 2024-06-18 LAB
ALBUMIN SERPL-MCNC: 4.4 G/DL (ref 3.5–5.2)
ALBUMIN/GLOB SERPL: 1.8 {RATIO} (ref 1–2.5)
ALP SERPL-CCNC: 61 U/L (ref 40–129)
ALT SERPL-CCNC: 16 U/L (ref 5–41)
AMPHET UR QL SCN: NEGATIVE
ANION GAP SERPL CALCULATED.3IONS-SCNC: 12 MMOL/L (ref 9–17)
AST SERPL-CCNC: 15 U/L
BASOPHILS # BLD: 0.05 K/UL (ref 0–0.2)
BASOPHILS NFR BLD: 1 % (ref 0–2)
BENZODIAZ UR QL: NEGATIVE
BILIRUB SERPL-MCNC: 0.9 MG/DL (ref 0.3–1.2)
BILIRUB UR QL STRIP: NEGATIVE
BUN SERPL-MCNC: 13 MG/DL (ref 8–23)
BUN/CREAT SERPL: 16 (ref 9–20)
CALCIUM SERPL-MCNC: 9.2 MG/DL (ref 8.6–10.4)
CANNABINOIDS UR QL SCN: NEGATIVE
CHLORIDE SERPL-SCNC: 100 MMOL/L (ref 98–107)
CO2 SERPL-SCNC: 26 MMOL/L (ref 20–31)
COCAINE UR QL SCN: NEGATIVE
COLOR UR: YELLOW
CREAT SERPL-MCNC: 0.8 MG/DL (ref 0.7–1.2)
EOSINOPHIL # BLD: 0.68 K/UL (ref 0–0.44)
EOSINOPHILS RELATIVE PERCENT: 9 % (ref 1–4)
EPI CELLS #/AREA URNS HPF: ABNORMAL /HPF (ref 0–5)
ERYTHROCYTE [DISTWIDTH] IN BLOOD BY AUTOMATED COUNT: 13 % (ref 11.8–14.4)
FENTANYL UR QL: NEGATIVE
GFR, ESTIMATED: >90 ML/MIN/1.73M2
GLUCOSE BLD-MCNC: 179 MG/DL (ref 74–100)
GLUCOSE SERPL-MCNC: 171 MG/DL (ref 70–99)
GLUCOSE UR STRIP-MCNC: NEGATIVE MG/DL
HCT VFR BLD AUTO: 40.4 % (ref 40.7–50.3)
HGB BLD-MCNC: 13.5 G/DL (ref 13–17)
HGB UR QL STRIP.AUTO: NEGATIVE
IMM GRANULOCYTES # BLD AUTO: <0.03 K/UL (ref 0–0.3)
IMM GRANULOCYTES NFR BLD: 0 %
INR PPP: 0.9
KETONES UR STRIP-MCNC: NEGATIVE MG/DL
LEUKOCYTE ESTERASE UR QL STRIP: NEGATIVE
LYMPHOCYTES NFR BLD: 1.78 K/UL (ref 1.1–3.7)
LYMPHOCYTES RELATIVE PERCENT: 24 % (ref 24–43)
MCH RBC QN AUTO: 28.8 PG (ref 25.2–33.5)
MCHC RBC AUTO-ENTMCNC: 33.4 G/DL (ref 28.4–34.8)
MCV RBC AUTO: 86.1 FL (ref 82.6–102.9)
METHADONE UR QL: NEGATIVE
MONOCYTES NFR BLD: 0.85 K/UL (ref 0.1–1.2)
MONOCYTES NFR BLD: 12 % (ref 3–12)
NEUTROPHILS NFR BLD: 54 % (ref 36–65)
NEUTS SEG NFR BLD: 3.98 K/UL (ref 1.5–8.1)
NITRITE UR QL STRIP: NEGATIVE
NRBC BLD-RTO: 0 PER 100 WBC
OPIATES UR QL SCN: NEGATIVE
OXYCODONE UR QL SCN: NEGATIVE
PARTIAL THROMBOPLASTIN TIME: 27.1 SEC (ref 26.8–34.8)
PCP UR QL SCN: NEGATIVE
PLATELET # BLD AUTO: 296 K/UL (ref 138–453)
PMV BLD AUTO: 10.1 FL (ref 8.1–13.5)
POTASSIUM SERPL-SCNC: 4 MMOL/L (ref 3.7–5.3)
PROT SERPL-MCNC: 6.9 G/DL (ref 6.4–8.3)
PROT UR STRIP-MCNC: NEGATIVE MG/DL
PROTHROMBIN TIME: 11.7 SEC (ref 11.7–14.1)
RBC # BLD AUTO: 4.69 M/UL (ref 4.21–5.77)
RBC #/AREA URNS HPF: ABNORMAL /HPF (ref 0–2)
SODIUM SERPL-SCNC: 138 MMOL/L (ref 135–144)
SP GR UR STRIP: <1.005 (ref 1.01–1.02)
TEST INFORMATION: NORMAL
UROBILINOGEN UR STRIP-ACNC: NORMAL EU/DL (ref 0–1)
WBC #/AREA URNS HPF: ABNORMAL /HPF (ref 0–5)
WBC OTHER # BLD: 7.4 K/UL (ref 3.5–11.3)

## 2024-06-18 PROCEDURE — 85730 THROMBOPLASTIN TIME PARTIAL: CPT

## 2024-06-18 PROCEDURE — 82947 ASSAY GLUCOSE BLOOD QUANT: CPT

## 2024-06-18 PROCEDURE — 70498 CT ANGIOGRAPHY NECK: CPT

## 2024-06-18 PROCEDURE — 6370000000 HC RX 637 (ALT 250 FOR IP): Performed by: PHYSICIAN ASSISTANT

## 2024-06-18 PROCEDURE — 6360000004 HC RX CONTRAST MEDICATION: Performed by: PHYSICIAN ASSISTANT

## 2024-06-18 PROCEDURE — 71045 X-RAY EXAM CHEST 1 VIEW: CPT

## 2024-06-18 PROCEDURE — 80307 DRUG TEST PRSMV CHEM ANLYZR: CPT

## 2024-06-18 PROCEDURE — 99285 EMERGENCY DEPT VISIT HI MDM: CPT

## 2024-06-18 PROCEDURE — 93005 ELECTROCARDIOGRAM TRACING: CPT | Performed by: EMERGENCY MEDICINE

## 2024-06-18 PROCEDURE — 85025 COMPLETE CBC W/AUTO DIFF WBC: CPT

## 2024-06-18 PROCEDURE — 80053 COMPREHEN METABOLIC PANEL: CPT

## 2024-06-18 PROCEDURE — 81001 URINALYSIS AUTO W/SCOPE: CPT

## 2024-06-18 PROCEDURE — 85610 PROTHROMBIN TIME: CPT

## 2024-06-18 RX ORDER — CLOPIDOGREL BISULFATE 75 MG/1
300 TABLET ORAL ONCE
Status: COMPLETED | OUTPATIENT
Start: 2024-06-18 | End: 2024-06-18

## 2024-06-18 RX ADMIN — CLOPIDOGREL BISULFATE 300 MG: 75 TABLET ORAL at 19:46

## 2024-06-18 RX ADMIN — IOPAMIDOL 75 ML: 755 INJECTION, SOLUTION INTRAVENOUS at 17:49

## 2024-06-18 ASSESSMENT — PAIN - FUNCTIONAL ASSESSMENT: PAIN_FUNCTIONAL_ASSESSMENT: NONE - DENIES PAIN

## 2024-06-18 ASSESSMENT — LIFESTYLE VARIABLES: HOW OFTEN DO YOU HAVE A DRINK CONTAINING ALCOHOL: 2-4 TIMES A MONTH

## 2024-06-18 NOTE — ED PROVIDER NOTES
[facility]  EMERGENCY DEPARTMENT ENCOUNTER      Pt Name: Keven Iyer  MRN: [id number]  Birthdate 1955  Date of evaluation: 6/18/2024  Provider: Audra Neves PA-C    CHIEF COMPLAINT       Chief Complaint   Patient presents with   • Loss of Vision     Sent from eye doctor. Pt reports had sudden loss of vision to left eye yesterday to a portion of the eye. Approx. 1400.  Still has the vision loss. Pt denies any other symptoms. Hx strokes in the past. Sent by optometry per their protocol.          HISTORY OF PRESENT ILLNESS      Keven Iyer is a 69 y.o. male who presents to the emergency department loss of vision in his eye.  Patient states that a little over 24 hours ago he lost partial vision in his left eye.  A quarter of his vision is lost in the left lower quadrant of his left eye.  Patient was seen and evaluated at his optometrist office.  He was diagnosed either by history or direct visualization with an acute retinal branch artery occlusion.  Patient has previous history of CVA.  He is currently on baby aspirin only.  Plavix has been discontinued.  No known history of A-fib and the patient is not anticoagulated.  He has otherwise been well.  There are no other complaints or concerns.        REVIEW OF SYSTEMS       Review of Systems   Constitutional:  Negative for fever.   Eyes:  Positive for visual disturbance.   Respiratory:  Negative for cough and shortness of breath.    Cardiovascular:  Negative for chest pain and palpitations.   Neurological:  Negative for dizziness, weakness, numbness and headaches.         PAST MEDICAL HISTORY     Past Medical History:   Diagnosis Date   • CVA (cerebral vascular accident) (HCC) 2021    Due to blockage   • Diabetes mellitus (HCC)    • HLD (hyperlipidemia)    • Hypertension          SURGICAL HISTORY       Past Surgical History:   Procedure Laterality Date   • CAROTID ENDARTERECTOMY  01/04/2021    [facility]   • ESOPHAGOGASTRODUODENOSCOPY  03/28/2024 
ill-appearing.   HENT:      Head: Normocephalic and atraumatic.      Right Ear: Tympanic membrane normal.      Left Ear: Tympanic membrane normal.      Nose: Nose normal.   Eyes:      Conjunctiva/sclera: Conjunctivae normal.      Pupils: Pupils are equal, round, and reactive to light.   Skin:     General: Skin is warm and dry.   Neurological:      General: No focal deficit present.      Mental Status: He is alert. Mental status is at baseline.   Psychiatric:         Mood and Affect: Mood normal.         Behavior: Behavior normal.             DIAGNOSTIC RESULTS     EKG: Sinus bradycardia, rate 54 bpm, left axis deviation, abnormal EKG, nonspecific ST and T wave abnormality, no STEMI        Interpretation per the Radiologist below, if available at the time of this note:    CTA HEAD NECK W CONTRAST   Final Result   1. No acute intracranial abnormality.   2. Moderate stenosis at the left common carotid artery origin.  Approximately   65% stenosis of the distal left common carotid artery, and up to   approximately 70% stenosis of the proximal to mid cervical left ICA.   3. Focal marked stenosis at the bilateral vertebral artery origins.   4. Moderate to severe stenosis at the left subclavian artery origin.   5. No significant stenosis or large vessel occlusion of the intracranial   arteries.         XR CHEST PORTABLE   Final Result   No acute process.               LABS:  Labs Reviewed   CBC WITH AUTO DIFFERENTIAL - Abnormal; Notable for the following components:       Result Value    Hematocrit 40.4 (*)     Eosinophils % 9 (*)     Eosinophils Absolute 0.68 (*)     All other components within normal limits   COMPREHENSIVE METABOLIC PANEL - Abnormal; Notable for the following components:    Glucose 171 (*)     All other components within normal limits   URINALYSIS WITH MICROSCOPIC - Abnormal; Notable for the following components:    Specific Gravity, UA <1.005 (*)     All other components within normal limits   GLUCOSE,

## 2024-06-19 ENCOUNTER — HOSPITAL ENCOUNTER (INPATIENT)
Age: 69
LOS: 3 days | Discharge: HOME | DRG: 035 | End: 2024-06-22
Attending: STUDENT IN AN ORGANIZED HEALTH CARE EDUCATION/TRAINING PROGRAM
Payer: MEDICARE

## 2024-06-19 ENCOUNTER — APPOINTMENT (OUTPATIENT)
Dept: MRI IMAGING | Age: 69
DRG: 035 | End: 2024-06-19
Attending: STUDENT IN AN ORGANIZED HEALTH CARE EDUCATION/TRAINING PROGRAM
Payer: MEDICARE

## 2024-06-19 VITALS
SYSTOLIC BLOOD PRESSURE: 175 MMHG | BODY MASS INDEX: 23.96 KG/M2 | TEMPERATURE: 97.4 F | DIASTOLIC BLOOD PRESSURE: 59 MMHG | HEART RATE: 56 BPM | RESPIRATION RATE: 17 BRPM | OXYGEN SATURATION: 97 % | WEIGHT: 144 LBS

## 2024-06-19 PROBLEM — E11.65 TYPE 2 DIABETES MELLITUS WITH HYPERGLYCEMIA, WITHOUT LONG-TERM CURRENT USE OF INSULIN (HCC): Status: ACTIVE | Noted: 2024-06-19

## 2024-06-19 PROBLEM — I63.232 ACUTE ISCHEMIC LEFT INTERNAL CAROTID ARTERY (ICA) STROKE (HCC): Status: ACTIVE | Noted: 2024-06-19

## 2024-06-19 PROBLEM — R29.90 STROKE-LIKE SYMPTOMS: Status: ACTIVE | Noted: 2024-06-19

## 2024-06-19 PROBLEM — E78.2 MIXED HYPERLIPIDEMIA: Status: ACTIVE | Noted: 2024-06-19

## 2024-06-19 PROBLEM — Z98.890 HISTORY OF CEA (CAROTID ENDARTERECTOMY): Status: ACTIVE | Noted: 2024-06-19

## 2024-06-19 PROBLEM — H34.10 CRAO (CENTRAL RETINAL ARTERY OCCLUSION): Status: ACTIVE | Noted: 2024-06-19

## 2024-06-19 LAB
ALBUMIN SERPL-MCNC: 4.3 G/DL (ref 3.5–5.2)
ALBUMIN/GLOB SERPL: 2 {RATIO} (ref 1–2.5)
ALP SERPL-CCNC: 56 U/L (ref 40–129)
ALT SERPL-CCNC: 10 U/L (ref 10–50)
ANION GAP SERPL CALCULATED.3IONS-SCNC: 11 MMOL/L (ref 9–16)
AST SERPL-CCNC: 20 U/L (ref 10–50)
BILIRUB SERPL-MCNC: 1.1 MG/DL (ref 0–1.2)
BUN SERPL-MCNC: 13 MG/DL (ref 8–23)
CALCIUM SERPL-MCNC: 9.2 MG/DL (ref 8.6–10.4)
CHLORIDE SERPL-SCNC: 102 MMOL/L (ref 98–107)
CO2 SERPL-SCNC: 27 MMOL/L (ref 20–31)
CREAT SERPL-MCNC: 0.8 MG/DL (ref 0.7–1.2)
EKG ATRIAL RATE: 54 BPM
EKG P-R INTERVAL: 148 MS
EKG QRS DURATION: 94 MS
EKG QTC CALCULATION (BAZETT): 419 MS
EKG R AXIS: -62 DEGREES
EKG T AXIS: 22 DEGREES
EST. AVERAGE GLUCOSE BLD GHB EST-MCNC: 157 MG/DL
GFR, ESTIMATED: >90 ML/MIN/1.73M2
GLUCOSE BLD-MCNC: 125 MG/DL (ref 75–110)
GLUCOSE BLD-MCNC: 132 MG/DL (ref 75–110)
GLUCOSE BLD-MCNC: 135 MG/DL (ref 75–110)
GLUCOSE BLD-MCNC: 69 MG/DL (ref 75–110)
GLUCOSE SERPL-MCNC: 119 MG/DL (ref 74–99)
HBA1C MFR BLD: 7.1 % (ref 4–6)
POTASSIUM SERPL-SCNC: 4 MMOL/L (ref 3.7–5.3)
PROT SERPL-MCNC: 6.4 G/DL (ref 6.6–8.7)
SODIUM SERPL-SCNC: 140 MMOL/L (ref 136–145)

## 2024-06-19 PROCEDURE — 6360000002 HC RX W HCPCS: Performed by: PSYCHIATRY & NEUROLOGY

## 2024-06-19 PROCEDURE — 80053 COMPREHEN METABOLIC PANEL: CPT

## 2024-06-19 PROCEDURE — 36415 COLL VENOUS BLD VENIPUNCTURE: CPT

## 2024-06-19 PROCEDURE — 70551 MRI BRAIN STEM W/O DYE: CPT

## 2024-06-19 PROCEDURE — 83036 HEMOGLOBIN GLYCOSYLATED A1C: CPT

## 2024-06-19 PROCEDURE — 2060000000 HC ICU INTERMEDIATE R&B

## 2024-06-19 PROCEDURE — 93010 ELECTROCARDIOGRAM REPORT: CPT | Performed by: INTERNAL MEDICINE

## 2024-06-19 PROCEDURE — 80061 LIPID PANEL: CPT

## 2024-06-19 PROCEDURE — 2580000003 HC RX 258: Performed by: PSYCHIATRY & NEUROLOGY

## 2024-06-19 PROCEDURE — 6370000000 HC RX 637 (ALT 250 FOR IP): Performed by: PSYCHIATRY & NEUROLOGY

## 2024-06-19 PROCEDURE — 99223 1ST HOSP IP/OBS HIGH 75: CPT | Performed by: PSYCHIATRY & NEUROLOGY

## 2024-06-19 PROCEDURE — 99223 1ST HOSP IP/OBS HIGH 75: CPT | Performed by: STUDENT IN AN ORGANIZED HEALTH CARE EDUCATION/TRAINING PROGRAM

## 2024-06-19 PROCEDURE — 92523 SPEECH SOUND LANG COMPREHEN: CPT

## 2024-06-19 PROCEDURE — 82947 ASSAY GLUCOSE BLOOD QUANT: CPT

## 2024-06-19 RX ORDER — LISINOPRIL 20 MG/1
10 TABLET ORAL DAILY
Status: DISCONTINUED | OUTPATIENT
Start: 2024-06-19 | End: 2024-06-22 | Stop reason: HOSPADM

## 2024-06-19 RX ORDER — SODIUM CHLORIDE 9 MG/ML
INJECTION, SOLUTION INTRAVENOUS PRN
Status: DISCONTINUED | OUTPATIENT
Start: 2024-06-19 | End: 2024-06-22 | Stop reason: HOSPADM

## 2024-06-19 RX ORDER — SODIUM CHLORIDE 0.9 % (FLUSH) 0.9 %
5-40 SYRINGE (ML) INJECTION EVERY 12 HOURS SCHEDULED
Status: DISCONTINUED | OUTPATIENT
Start: 2024-06-19 | End: 2024-06-22 | Stop reason: HOSPADM

## 2024-06-19 RX ORDER — ONDANSETRON 2 MG/ML
4 INJECTION INTRAMUSCULAR; INTRAVENOUS EVERY 6 HOURS PRN
Status: DISCONTINUED | OUTPATIENT
Start: 2024-06-19 | End: 2024-06-22 | Stop reason: HOSPADM

## 2024-06-19 RX ORDER — LABETALOL HYDROCHLORIDE 5 MG/ML
10 INJECTION, SOLUTION INTRAVENOUS
Status: DISCONTINUED | OUTPATIENT
Start: 2024-06-19 | End: 2024-06-19

## 2024-06-19 RX ORDER — SODIUM CHLORIDE 0.9 % (FLUSH) 0.9 %
5-40 SYRINGE (ML) INJECTION PRN
Status: DISCONTINUED | OUTPATIENT
Start: 2024-06-19 | End: 2024-06-22 | Stop reason: HOSPADM

## 2024-06-19 RX ORDER — ONDANSETRON 4 MG/1
4 TABLET, ORALLY DISINTEGRATING ORAL EVERY 8 HOURS PRN
Status: DISCONTINUED | OUTPATIENT
Start: 2024-06-19 | End: 2024-06-22 | Stop reason: HOSPADM

## 2024-06-19 RX ORDER — HYDRALAZINE HYDROCHLORIDE 20 MG/ML
10 INJECTION INTRAMUSCULAR; INTRAVENOUS
Status: DISCONTINUED | OUTPATIENT
Start: 2024-06-19 | End: 2024-06-20

## 2024-06-19 RX ORDER — ASPIRIN 81 MG/1
81 TABLET ORAL DAILY
Status: DISCONTINUED | OUTPATIENT
Start: 2024-06-20 | End: 2024-06-22 | Stop reason: HOSPADM

## 2024-06-19 RX ORDER — ROSUVASTATIN CALCIUM 20 MG/1
40 TABLET, COATED ORAL NIGHTLY
Status: DISCONTINUED | OUTPATIENT
Start: 2024-06-19 | End: 2024-06-22 | Stop reason: HOSPADM

## 2024-06-19 RX ORDER — POLYETHYLENE GLYCOL 3350 17 G/17G
17 POWDER, FOR SOLUTION ORAL DAILY PRN
Status: DISCONTINUED | OUTPATIENT
Start: 2024-06-19 | End: 2024-06-22 | Stop reason: HOSPADM

## 2024-06-19 RX ORDER — ENOXAPARIN SODIUM 100 MG/ML
40 INJECTION SUBCUTANEOUS DAILY
Status: DISCONTINUED | OUTPATIENT
Start: 2024-06-19 | End: 2024-06-22 | Stop reason: HOSPADM

## 2024-06-19 RX ORDER — SODIUM CHLORIDE 9 MG/ML
INJECTION, SOLUTION INTRAVENOUS CONTINUOUS
Status: DISCONTINUED | OUTPATIENT
Start: 2024-06-19 | End: 2024-06-22

## 2024-06-19 RX ORDER — HYDROCHLOROTHIAZIDE 25 MG/1
25 TABLET ORAL DAILY
Status: DISCONTINUED | OUTPATIENT
Start: 2024-06-19 | End: 2024-06-22 | Stop reason: HOSPADM

## 2024-06-19 RX ORDER — CLOPIDOGREL BISULFATE 75 MG/1
75 TABLET ORAL DAILY
Status: DISCONTINUED | OUTPATIENT
Start: 2024-06-20 | End: 2024-06-22 | Stop reason: HOSPADM

## 2024-06-19 RX ADMIN — ENOXAPARIN SODIUM 40 MG: 100 INJECTION SUBCUTANEOUS at 08:58

## 2024-06-19 RX ADMIN — ROSUVASTATIN CALCIUM 40 MG: 20 TABLET, FILM COATED ORAL at 21:05

## 2024-06-19 RX ADMIN — SODIUM CHLORIDE: 9 INJECTION, SOLUTION INTRAVENOUS at 18:05

## 2024-06-19 RX ADMIN — SODIUM CHLORIDE: 9 INJECTION, SOLUTION INTRAVENOUS at 03:07

## 2024-06-19 ASSESSMENT — ENCOUNTER SYMPTOMS
WHEEZING: 0
CONSTIPATION: 0
NAUSEA: 0
RHINORRHEA: 0
SHORTNESS OF BREATH: 0
CHEST TIGHTNESS: 0
PHOTOPHOBIA: 0
DIARRHEA: 0
SORE THROAT: 0
COUGH: 0
EYE DISCHARGE: 0
CHOKING: 0
ABDOMINAL PAIN: 0
ABDOMINAL DISTENTION: 0
VOICE CHANGE: 0
BACK PAIN: 0
TROUBLE SWALLOWING: 0
EYE PAIN: 0
EYE REDNESS: 0
EYE ITCHING: 0

## 2024-06-19 NOTE — CONSULTS
Endovascular Neurosurgery Consult      Reason for evaluation: Symptomatic 70% stenosis of the proximal to mid cervical left ICA, Left BRAO     SUBJECTIVE:   History of Chief Complaint:      Keven Iyer is a 69 y.o. right handed male with a history of hypertension, hyperlipidemia, symptomatic right ICA status post carotid endarterectomy in January 2021 who presented on 6/16/2020 for to [facility] facility with sudden onset vision loss in his left eye.  Patient's symptoms were ongoing for 24 hours prior to his arrival, was seen at an optometrist office and was diagnosed with an acute retinal branch artery occlusion.  CT angiogram showed moderate stenosis of left carotid artery.  Patient was loaded with 300 mg of Plavix and given 81 mg of aspirin.  Transferred to our facility for DSA and possible stenting.  Patient was seen and examined at approximately 2:50 AM this morning. Patient has lower quadrant temporal aspect vision loss. Otherwise no other neurological deficits       Allergies  is allergic to codeine.  Medications  Prior to Admission medications    Medication Sig Start Date End Date Taking? Authorizing Provider   aspirin 81 MG chewable tablet Take 1 tablet by mouth daily    Cynthia Watts MD   sildenafil (VIAGRA) 100 MG tablet Take 1 tablet by mouth as needed for Erectile Dysfunction    Cynthia Watts MD   hydroCHLOROthiazide (HYDRODIURIL) 25 MG tablet Take 1 tablet by mouth daily    Cynthia Watts MD   metFORMIN (GLUCOPHAGE) 1000 MG tablet Take 1 tablet by mouth 2 times daily (with meals)    Cynthia Watts MD   lisinopril (PRINIVIL;ZESTRIL) 10 MG tablet Take 1 tablet by mouth daily    Cynthia Watts MD   atorvastatin (LIPITOR) 80 MG tablet Take 1 tablet by mouth daily    Cynthia Watts MD    Scheduled Meds:   sodium chloride flush  5-40 mL IntraVENous 2 times per day    enoxaparin  40 mg SubCUTAneous Daily    rosuvastatin  40 mg Oral Nightly    [START ON

## 2024-06-19 NOTE — ED NOTES
Per Audra Gee patient is allowed to eat, patient provided with turkey tray at this time. Patient updated that there is a bed assigned at [facility] just waiting on transport ETA at this time. Patient denies other needs, call light within reach, family at bedside

## 2024-06-19 NOTE — H&P
Inpatient Stroke H&P Note      Reason for admission:  BRAO  History Obtained From:  patient, chart review   Chief complaint: acute vision loss   Allergies:  Codeine    HISTORY OF PRESENTING ILLNESS     Keven Iyer is a 69 y.o. right handed male with a history of hypertension, hyperlipidemia, symptomatic right ICA status post carotid endarterectomy in January 2021 who [facility] with sudden onset vision loss in his left eye.  Patient's symptoms were ongoing for 24 hours prior to his arrival, was seen at an optometrist office and was diagnosed with an acute retinal branch artery occlusion.  CT angiogram showed moderate stenosis of left carotid artery.  Patient was loaded with 300 mg of Plavix and given 81 mg of aspirin.  Transferred to our facility for DSA and possible stenting.  Patient was seen and examined at approximately 2:50 AM this morning. Patient has lower quadrant temporal aspect vision loss. Otherwise no other neurological deficits.     Review of Systems   Constitutional:  Negative for activity change, appetite change, chills, diaphoresis, fatigue and fever.   HENT:  Negative for congestion, drooling, rhinorrhea, sore throat, trouble swallowing and voice change.    Eyes:  Positive for visual disturbance. Negative for photophobia, pain, discharge, redness and itching.   Respiratory:  Negative for cough, choking, chest tightness, shortness of breath and wheezing.    Cardiovascular:  Negative for chest pain, palpitations and leg swelling.   Gastrointestinal:  Negative for abdominal distention, abdominal pain, constipation, diarrhea and nausea.   Endocrine: Negative for polydipsia, polyphagia and polyuria.   Genitourinary:  Negative for difficulty urinating and dysuria.   Musculoskeletal:  Negative for arthralgias, back pain, gait problem, joint swelling, myalgias and neck pain.   Neurological:  Negative for dizziness, seizures, syncope, facial asymmetry, speech        Plan:  Admit to stroke unit  Frequent neuro-checks (q4h)  Permissive HTN for 24h  Baseline EKG and CXR  Basic labs: CBC, CMP, coagulation panel, troponin  Stroke labwork: HgbA1C, lipid panel  Intravenous hydration with normal saline at 75cc per hour  Swallow evaluation prior to advancing diet   Head of bed > 30 degrees for aspiration prevention and aspiration precautions ordered.  MRI Brain without contrast   Trans-thoracic echocardiogram with bubble study   Continuous cardiac telemetry to monitor for arrhythmia  Aspirin 81 mg daily and Plavix 75 mg qD   High intensity statin therapy (long-term goal LDL < 70)  Tight glucose control (long-term goal HgbA1c < 6%)  Stroke education and counseling provided   Physical therapy, occupational therapy, speech therapy consults  Consulted social work and case management for help with discharge    Prophylaxis: SCDs (DVT), subcutaneous Lovenox      Patient was discussed with attending physician, Dr. Interiano.   -------------------------  Tian Henriquez DO  Neurology Resident, PGY-3  [facility]

## 2024-06-19 NOTE — CARE COORDINATION
Case Management Assessment  Initial Evaluation    Date/Time of Evaluation: 6/19/2024 4:23 PM  Assessment Completed by: Nany Kimball RN    If patient is discharged prior to next notation, then this note serves as note for discharge by case management.    Patient Name: Keven Iyer                   YOB: 1955  Diagnosis: Stroke-like symptoms [R29.90]  CRAO (central retinal artery occlusion) [H34.10]                   Date / Time: 6/19/2024  2:33 AM    Patient Admission Status: Inpatient   Readmission Risk (Low < 19, Mod (19-27), High > 27): Readmission Risk Score: 10    Current PCP: Vernon Vincent, DO  PCP verified by CM? Yes (Dr. Vernon Vincent)    Chart Reviewed: Yes      History Provided by: Patient  Patient Orientation: Alert and Oriented, Person, Place, Situation    Patient Cognition: Alert    Hospitalization in the last 30 days (Readmission):  No    If yes, Readmission Assessment in CM Navigator will be completed.    Advance Directives:      Code Status: Full Code   Patient's Primary Decision Maker is: Legal Next of Kin    Primary Decision Maker: Luz Iyer - Spouse - [phone]    Discharge Planning:    Patient lives with: (P) Spouse/Significant Other Type of Home: (P) House  Primary Care Giver: Self  Patient Support Systems include: Spouse/Significant Other, Children, Family Members   Current Financial resources: Medicare  Current community resources: None  Current services prior to admission: (P) None            Current DME:              Type of Home Care services:  (P) None    ADLS  Prior functional level: Independent in ADLs/IADLs  Current functional level: Independent in ADLs/IADLs    PT AM-PAC:   /24  OT AM-PAC:   /24    Family can provide assistance at DC: Yes  Would you like Case Management to discuss the discharge plan with any other family members/significant others, and if so, who? Yes (spouse)  Plans to Return to Present Housing: Yes  Other Identified Issues/Barriers to

## 2024-06-19 NOTE — PROGRESS NOTES
SLP ALL NOTES  Facility/Department: [address] STEPDOWN  Initial Speech/Language/Cognitive Assessment    NAME: Keven Iyer  : 1955   MRN: [id number]  ADMISSION DATE: 2024  ADMITTING DIAGNOSIS: has Acute epigastric pain; Hypertension; Type 2 diabetes mellitus without complication, without long-term current use of insulin (HCC); Duodenitis; Esophagitis; Adrenal mass (HCC); Stroke-like symptoms; and CRAO (central retinal artery occlusion) on their problem list.      Date of Eval: 2024   Evaluating Therapist: AMBER THOMAS    RECENT RESULTS  CT OF HEAD/MRI:   IMPRESSION: 24  1. No acute intracranial abnormality.  2. Moderate stenosis at the left common carotid artery origin.  Approximately  65% stenosis of the distal left common carotid artery, and up to  approximately 70% stenosis of the proximal to mid cervical left ICA.  3. Focal marked stenosis at the bilateral vertebral artery origins.  4. Moderate to severe stenosis at the left subclavian artery origin.  5. No significant stenosis or large vessel occlusion of the intracranial  arteries    Primary Complaint:   Keven Iyer is a 69 y.o. male who presents to the emergency department loss of vision in his eye.  Patient states that a little over 24 hours ago he lost partial vision in his left eye.  A quarter of his vision is lost in the left lower quadrant of his left eye.  Patient was seen and evaluated at his optometrist office.  He was diagnosed either by history or direct visualization with an acute retinal branch artery occlusion.  Patient has previous history of CVA.  He is currently on baby aspirin only.  Plavix has been discontinued.  No known history of A-fib and the patient is not anticoagulated.  He has otherwise been well.  There are no other complaints or concerns.     Pain:  Pain Assessment  Pain Assessment: None - Denies Pain    Vision/ Hearing  Vision  Vision: Impaired  Vision Exceptions: Wears glasses at all

## 2024-06-19 NOTE — PLAN OF CARE
Problem: Discharge Planning  Goal: Discharge to home or other facility with appropriate resources  6/19/2024 1843 by Juarez Arguelles RN  Outcome: Progressing  Flowsheets (Taken 6/19/2024 1843)  Discharge to home or other facility with appropriate resources: Identify barriers to discharge with patient and caregiver  6/19/2024 0512 by Valerie Jauregui RN  Outcome: Progressing     Problem: Safety - Adult  Goal: Free from fall injury  6/19/2024 1843 by Juarez Arguelles RN  Outcome: Progressing  Flowsheets (Taken 6/19/2024 1843)  Free From Fall Injury: Instruct family/caregiver on patient safety  6/19/2024 0512 by Valerie Jauregui RN  Outcome: Progressing     Problem: ABCDS Injury Assessment  Goal: Absence of physical injury  6/19/2024 1843 by Juarez Arguelles RN  Outcome: Progressing  Flowsheets (Taken 6/19/2024 1843)  Absence of Physical Injury: Implement safety measures based on patient assessment  6/19/2024 0512 by Valerie Jauregui RN  Outcome: Progressing

## 2024-06-20 ENCOUNTER — ANESTHESIA EVENT (OUTPATIENT)
Dept: INTERVENTIONAL RADIOLOGY/VASCULAR | Age: 69
End: 2024-06-20
Payer: MEDICARE

## 2024-06-20 ENCOUNTER — APPOINTMENT (OUTPATIENT)
Dept: INTERVENTIONAL RADIOLOGY/VASCULAR | Age: 69
DRG: 035 | End: 2024-06-20
Attending: STUDENT IN AN ORGANIZED HEALTH CARE EDUCATION/TRAINING PROGRAM
Payer: MEDICARE

## 2024-06-20 ENCOUNTER — ANESTHESIA (OUTPATIENT)
Dept: INTERVENTIONAL RADIOLOGY/VASCULAR | Age: 69
End: 2024-06-20
Payer: MEDICARE

## 2024-06-20 PROBLEM — I65.22 STENOSIS OF LEFT INTERNAL CAROTID ARTERY: Status: ACTIVE | Noted: 2024-06-20

## 2024-06-20 LAB
ABO + RH BLD: NORMAL
ACT BLD: 140 SEC (ref 79–149)
ACT BLD: 224 SEC (ref 79–149)
ACT BLD: 232 SEC (ref 79–149)
ALBUMIN SERPL-MCNC: 4 G/DL (ref 3.5–5.2)
ALBUMIN/GLOB SERPL: 2 {RATIO} (ref 1–2.5)
ALP SERPL-CCNC: 55 U/L (ref 40–129)
ALT SERPL-CCNC: 8 U/L (ref 10–50)
ANION GAP SERPL CALCULATED.3IONS-SCNC: 11 MMOL/L (ref 9–16)
ARM BAND NUMBER: NORMAL
AST SERPL-CCNC: 19 U/L (ref 10–50)
BILIRUB SERPL-MCNC: 0.8 MG/DL (ref 0–1.2)
BLOOD BANK SAMPLE EXPIRATION: NORMAL
BLOOD GROUP ANTIBODIES SERPL: NEGATIVE
BUN SERPL-MCNC: 12 MG/DL (ref 8–23)
CALCIUM SERPL-MCNC: 8.8 MG/DL (ref 8.6–10.4)
CHLORIDE SERPL-SCNC: 105 MMOL/L (ref 98–107)
CHOLEST SERPL-MCNC: 109 MG/DL (ref 0–199)
CHOLESTEROL/HDL RATIO: 2.4
CLOSURE TME COLL+ADP BLD: 102 SEC (ref 67–112)
CO2 SERPL-SCNC: 23 MMOL/L (ref 20–31)
COLLAGEN EPINEPHRINE TIME: 183 SEC (ref 85–172)
CREAT SERPL-MCNC: 0.8 MG/DL (ref 0.7–1.2)
ERYTHROCYTE [DISTWIDTH] IN BLOOD BY AUTOMATED COUNT: 13 % (ref 11.8–14.4)
GFR, ESTIMATED: >90 ML/MIN/1.73M2
GLUCOSE BLD-MCNC: 129 MG/DL (ref 75–110)
GLUCOSE SERPL-MCNC: 134 MG/DL (ref 74–99)
HCT VFR BLD AUTO: 40.7 % (ref 40.7–50.3)
HDLC SERPL-MCNC: 45 MG/DL
HGB BLD-MCNC: 12.9 G/DL (ref 13–17)
LDLC SERPL CALC-MCNC: 43 MG/DL (ref 0–100)
MCH RBC QN AUTO: 27.9 PG (ref 25.2–33.5)
MCHC RBC AUTO-ENTMCNC: 31.7 G/DL (ref 28.4–34.8)
MCV RBC AUTO: 87.9 FL (ref 82.6–102.9)
NRBC BLD-RTO: 0 PER 100 WBC
PLATELET # BLD AUTO: 263 K/UL (ref 138–453)
PLATELET FUNCTION INTERP: ABNORMAL
PMV BLD AUTO: 9.8 FL (ref 8.1–13.5)
POTASSIUM SERPL-SCNC: 3.8 MMOL/L (ref 3.7–5.3)
PROT SERPL-MCNC: 6.2 G/DL (ref 6.6–8.7)
RBC # BLD AUTO: 4.63 M/UL (ref 4.21–5.77)
SODIUM SERPL-SCNC: 139 MMOL/L (ref 136–145)
TRIGL SERPL-MCNC: 103 MG/DL
WBC OTHER # BLD: 4.8 K/UL (ref 3.5–11.3)

## 2024-06-20 PROCEDURE — 86901 BLOOD TYPING SEROLOGIC RH(D): CPT

## 2024-06-20 PROCEDURE — 94761 N-INVAS EAR/PLS OXIMETRY MLT: CPT

## 2024-06-20 PROCEDURE — 80053 COMPREHEN METABOLIC PANEL: CPT

## 2024-06-20 PROCEDURE — 86900 BLOOD TYPING SEROLOGIC ABO: CPT

## 2024-06-20 PROCEDURE — C1769 GUIDE WIRE: HCPCS

## 2024-06-20 PROCEDURE — 85347 COAGULATION TIME ACTIVATED: CPT

## 2024-06-20 PROCEDURE — B3141ZZ FLUOROSCOPY OF LEFT COMMON CAROTID ARTERY USING LOW OSMOLAR CONTRAST: ICD-10-PCS | Performed by: PSYCHIATRY & NEUROLOGY

## 2024-06-20 PROCEDURE — 99232 SBSQ HOSP IP/OBS MODERATE 35: CPT | Performed by: STUDENT IN AN ORGANIZED HEALTH CARE EDUCATION/TRAINING PROGRAM

## 2024-06-20 PROCEDURE — G0269 OCCLUSIVE DEVICE IN VEIN ART: HCPCS

## 2024-06-20 PROCEDURE — 6360000002 HC RX W HCPCS: Performed by: NURSE ANESTHETIST, CERTIFIED REGISTERED

## 2024-06-20 PROCEDURE — 37215 TRANSCATH STENT CCA W/EPS: CPT

## 2024-06-20 PROCEDURE — 6370000000 HC RX 637 (ALT 250 FOR IP): Performed by: PSYCHIATRY & NEUROLOGY

## 2024-06-20 PROCEDURE — 36415 COLL VENOUS BLD VENIPUNCTURE: CPT

## 2024-06-20 PROCEDURE — 2500000003 HC RX 250 WO HCPCS

## 2024-06-20 PROCEDURE — 2580000003 HC RX 258

## 2024-06-20 PROCEDURE — 2000000000 HC ICU R&B

## 2024-06-20 PROCEDURE — 3700000001 HC ADD 15 MINUTES (ANESTHESIA)

## 2024-06-20 PROCEDURE — 3700000000 HC ANESTHESIA ATTENDED CARE

## 2024-06-20 PROCEDURE — 85576 BLOOD PLATELET AGGREGATION: CPT

## 2024-06-20 PROCEDURE — 6360000004 HC RX CONTRAST MEDICATION: Performed by: STUDENT IN AN ORGANIZED HEALTH CARE EDUCATION/TRAINING PROGRAM

## 2024-06-20 PROCEDURE — 037L3DZ DILATION OF LEFT INTERNAL CAROTID ARTERY WITH INTRALUMINAL DEVICE, PERCUTANEOUS APPROACH: ICD-10-PCS | Performed by: PSYCHIATRY & NEUROLOGY

## 2024-06-20 PROCEDURE — 86850 RBC ANTIBODY SCREEN: CPT

## 2024-06-20 PROCEDURE — 2580000003 HC RX 258: Performed by: PSYCHIATRY & NEUROLOGY

## 2024-06-20 PROCEDURE — 82947 ASSAY GLUCOSE BLOOD QUANT: CPT

## 2024-06-20 PROCEDURE — 6360000002 HC RX W HCPCS

## 2024-06-20 PROCEDURE — 85027 COMPLETE CBC AUTOMATED: CPT

## 2024-06-20 PROCEDURE — 99232 SBSQ HOSP IP/OBS MODERATE 35: CPT | Performed by: PSYCHIATRY & NEUROLOGY

## 2024-06-20 RX ORDER — SODIUM CHLORIDE 0.9 % (FLUSH) 0.9 %
5-40 SYRINGE (ML) INJECTION PRN
Status: DISCONTINUED | OUTPATIENT
Start: 2024-06-20 | End: 2024-06-22

## 2024-06-20 RX ORDER — PHENYLEPHRINE HCL IN 0.9% NACL 1 MG/10 ML
SYRINGE (ML) INTRAVENOUS PRN
Status: DISCONTINUED | OUTPATIENT
Start: 2024-06-20 | End: 2024-06-20 | Stop reason: SDUPTHER

## 2024-06-20 RX ORDER — FENTANYL CITRATE 50 UG/ML
25 INJECTION, SOLUTION INTRAMUSCULAR; INTRAVENOUS EVERY 5 MIN PRN
OUTPATIENT
Start: 2024-06-20

## 2024-06-20 RX ORDER — SODIUM CHLORIDE 9 MG/ML
INJECTION, SOLUTION INTRAVENOUS PRN
Status: DISCONTINUED | OUTPATIENT
Start: 2024-06-20 | End: 2024-06-22

## 2024-06-20 RX ORDER — PROTAMINE SULFATE 10 MG/ML
INJECTION, SOLUTION INTRAVENOUS PRN
Status: DISCONTINUED | OUTPATIENT
Start: 2024-06-20 | End: 2024-06-20 | Stop reason: SDUPTHER

## 2024-06-20 RX ORDER — EPHEDRINE SULFATE/0.9% NACL/PF 25 MG/5 ML
SYRINGE (ML) INTRAVENOUS PRN
Status: DISCONTINUED | OUTPATIENT
Start: 2024-06-20 | End: 2024-06-20 | Stop reason: SDUPTHER

## 2024-06-20 RX ORDER — SODIUM CHLORIDE, SODIUM LACTATE, POTASSIUM CHLORIDE, CALCIUM CHLORIDE 600; 310; 30; 20 MG/100ML; MG/100ML; MG/100ML; MG/100ML
INJECTION, SOLUTION INTRAVENOUS CONTINUOUS PRN
Status: DISCONTINUED | OUTPATIENT
Start: 2024-06-20 | End: 2024-06-20 | Stop reason: SDUPTHER

## 2024-06-20 RX ORDER — SODIUM CHLORIDE 9 MG/ML
INJECTION, SOLUTION INTRAVENOUS PRN
OUTPATIENT
Start: 2024-06-20

## 2024-06-20 RX ORDER — SODIUM CHLORIDE 0.9 % (FLUSH) 0.9 %
5-40 SYRINGE (ML) INJECTION PRN
OUTPATIENT
Start: 2024-06-20

## 2024-06-20 RX ORDER — FENTANYL CITRATE 50 UG/ML
INJECTION, SOLUTION INTRAMUSCULAR; INTRAVENOUS PRN
Status: DISCONTINUED | OUTPATIENT
Start: 2024-06-20 | End: 2024-06-20 | Stop reason: SDUPTHER

## 2024-06-20 RX ORDER — GLYCOPYRROLATE 0.2 MG/ML
INJECTION INTRAMUSCULAR; INTRAVENOUS PRN
Status: DISCONTINUED | OUTPATIENT
Start: 2024-06-20 | End: 2024-06-20 | Stop reason: SDUPTHER

## 2024-06-20 RX ORDER — ACETAMINOPHEN 325 MG/1
650 TABLET ORAL EVERY 4 HOURS PRN
Status: DISCONTINUED | OUTPATIENT
Start: 2024-06-20 | End: 2024-06-22 | Stop reason: HOSPADM

## 2024-06-20 RX ORDER — 0.9 % SODIUM CHLORIDE 0.9 %
500 INTRAVENOUS SOLUTION INTRAVENOUS ONCE
Status: COMPLETED | OUTPATIENT
Start: 2024-06-20 | End: 2024-06-20

## 2024-06-20 RX ORDER — MIDAZOLAM HYDROCHLORIDE 1 MG/ML
INJECTION INTRAMUSCULAR; INTRAVENOUS PRN
Status: DISCONTINUED | OUTPATIENT
Start: 2024-06-20 | End: 2024-06-20 | Stop reason: SDUPTHER

## 2024-06-20 RX ORDER — NICARDIPINE HYDROCHLORIDE 0.1 MG/ML
INJECTION INTRAVENOUS CONTINUOUS PRN
Status: DISCONTINUED | OUTPATIENT
Start: 2024-06-20 | End: 2024-06-20 | Stop reason: SDUPTHER

## 2024-06-20 RX ORDER — HEPARIN SODIUM 1000 [USP'U]/ML
INJECTION, SOLUTION INTRAVENOUS; SUBCUTANEOUS PRN
Status: DISCONTINUED | OUTPATIENT
Start: 2024-06-20 | End: 2024-06-20 | Stop reason: SDUPTHER

## 2024-06-20 RX ORDER — HYDRALAZINE HYDROCHLORIDE 20 MG/ML
10 INJECTION INTRAMUSCULAR; INTRAVENOUS
Status: DISCONTINUED | OUTPATIENT
Start: 2024-06-20 | End: 2024-06-21

## 2024-06-20 RX ORDER — IODIXANOL 270 MG/ML
86 INJECTION, SOLUTION INTRAVASCULAR
Status: COMPLETED | OUTPATIENT
Start: 2024-06-20 | End: 2024-06-20

## 2024-06-20 RX ORDER — SODIUM CHLORIDE 0.9 % (FLUSH) 0.9 %
5-40 SYRINGE (ML) INJECTION EVERY 12 HOURS SCHEDULED
OUTPATIENT
Start: 2024-06-20

## 2024-06-20 RX ORDER — ONDANSETRON 2 MG/ML
4 INJECTION INTRAMUSCULAR; INTRAVENOUS EVERY 6 HOURS PRN
Status: DISCONTINUED | OUTPATIENT
Start: 2024-06-20 | End: 2024-06-21 | Stop reason: SDUPTHER

## 2024-06-20 RX ORDER — SODIUM CHLORIDE 0.9 % (FLUSH) 0.9 %
5-40 SYRINGE (ML) INJECTION EVERY 12 HOURS SCHEDULED
Status: DISCONTINUED | OUTPATIENT
Start: 2024-06-20 | End: 2024-06-22 | Stop reason: HOSPADM

## 2024-06-20 RX ORDER — ONDANSETRON 4 MG/1
4 TABLET, ORALLY DISINTEGRATING ORAL EVERY 8 HOURS PRN
Status: DISCONTINUED | OUTPATIENT
Start: 2024-06-20 | End: 2024-06-21 | Stop reason: SDUPTHER

## 2024-06-20 RX ORDER — NALOXONE HYDROCHLORIDE 0.4 MG/ML
INJECTION, SOLUTION INTRAMUSCULAR; INTRAVENOUS; SUBCUTANEOUS PRN
OUTPATIENT
Start: 2024-06-20

## 2024-06-20 RX ADMIN — Medication 100 MCG: at 13:10

## 2024-06-20 RX ADMIN — Medication 200 MCG: at 13:08

## 2024-06-20 RX ADMIN — ROSUVASTATIN CALCIUM 40 MG: 20 TABLET, FILM COATED ORAL at 20:31

## 2024-06-20 RX ADMIN — FENTANYL CITRATE 25 MCG: 50 INJECTION, SOLUTION INTRAMUSCULAR; INTRAVENOUS at 11:42

## 2024-06-20 RX ADMIN — GLYCOPYRROLATE 0.1 MG: 0.2 INJECTION INTRAMUSCULAR; INTRAVENOUS at 11:34

## 2024-06-20 RX ADMIN — GLYCOPYRROLATE 0.1 MG: 0.2 INJECTION INTRAMUSCULAR; INTRAVENOUS at 11:53

## 2024-06-20 RX ADMIN — GLYCOPYRROLATE 0.1 MG: 0.2 INJECTION INTRAMUSCULAR; INTRAVENOUS at 11:39

## 2024-06-20 RX ADMIN — SODIUM CHLORIDE, POTASSIUM CHLORIDE, SODIUM LACTATE AND CALCIUM CHLORIDE: 600; 310; 30; 20 INJECTION, SOLUTION INTRAVENOUS at 13:20

## 2024-06-20 RX ADMIN — FENTANYL CITRATE 25 MCG: 50 INJECTION, SOLUTION INTRAMUSCULAR; INTRAVENOUS at 11:05

## 2024-06-20 RX ADMIN — SODIUM CHLORIDE, POTASSIUM CHLORIDE, SODIUM LACTATE AND CALCIUM CHLORIDE: 600; 310; 30; 20 INJECTION, SOLUTION INTRAVENOUS at 11:43

## 2024-06-20 RX ADMIN — PROTAMINE SULFATE 10 MG: 10 INJECTION, SOLUTION INTRAVENOUS at 13:06

## 2024-06-20 RX ADMIN — SODIUM CHLORIDE 500 ML: 9 INJECTION, SOLUTION INTRAVENOUS at 15:19

## 2024-06-20 RX ADMIN — HEPARIN SODIUM 4700 UNITS: 1000 INJECTION INTRAVENOUS; SUBCUTANEOUS at 12:00

## 2024-06-20 RX ADMIN — CLOPIDOGREL BISULFATE 75 MG: 75 TABLET ORAL at 08:11

## 2024-06-20 RX ADMIN — PROTAMINE SULFATE 10 MG: 10 INJECTION, SOLUTION INTRAVENOUS at 13:05

## 2024-06-20 RX ADMIN — GLYCOPYRROLATE 0.1 MG: 0.2 INJECTION INTRAMUSCULAR; INTRAVENOUS at 12:48

## 2024-06-20 RX ADMIN — MIDAZOLAM 2 MG: 1 INJECTION INTRAMUSCULAR; INTRAVENOUS at 11:04

## 2024-06-20 RX ADMIN — FENTANYL CITRATE 25 MCG: 50 INJECTION, SOLUTION INTRAMUSCULAR; INTRAVENOUS at 11:35

## 2024-06-20 RX ADMIN — HEPARIN SODIUM 1000 UNITS: 1000 INJECTION INTRAVENOUS; SUBCUTANEOUS at 12:48

## 2024-06-20 RX ADMIN — HEPARIN SODIUM 2000 UNITS: 1000 INJECTION INTRAVENOUS; SUBCUTANEOUS at 12:21

## 2024-06-20 RX ADMIN — PROTAMINE SULFATE 10 MG: 10 INJECTION, SOLUTION INTRAVENOUS at 13:12

## 2024-06-20 RX ADMIN — Medication 2 G: at 11:29

## 2024-06-20 RX ADMIN — SODIUM CHLORIDE: 9 INJECTION, SOLUTION INTRAVENOUS at 18:51

## 2024-06-20 RX ADMIN — Medication 100 MCG: at 12:48

## 2024-06-20 RX ADMIN — EPHEDRINE SULFATE 5 MG: 5 INJECTION INTRAVENOUS at 12:54

## 2024-06-20 RX ADMIN — SODIUM CHLORIDE, PRESERVATIVE FREE 10 ML: 5 INJECTION INTRAVENOUS at 20:31

## 2024-06-20 RX ADMIN — MIDAZOLAM 1 MG: 1 INJECTION INTRAMUSCULAR; INTRAVENOUS at 11:13

## 2024-06-20 RX ADMIN — FENTANYL CITRATE 50 MCG: 50 INJECTION, SOLUTION INTRAMUSCULAR; INTRAVENOUS at 13:19

## 2024-06-20 RX ADMIN — SODIUM CHLORIDE, POTASSIUM CHLORIDE, SODIUM LACTATE AND CALCIUM CHLORIDE: 600; 310; 30; 20 INJECTION, SOLUTION INTRAVENOUS at 11:06

## 2024-06-20 RX ADMIN — Medication 100 MCG: at 12:37

## 2024-06-20 RX ADMIN — NICARDIPINE HYDROCHLORIDE 2.5 MG/HR: 0.1 INJECTION INTRAVENOUS at 13:23

## 2024-06-20 RX ADMIN — Medication 100 MCG: at 13:00

## 2024-06-20 RX ADMIN — PROTAMINE SULFATE 10 MG: 10 INJECTION, SOLUTION INTRAVENOUS at 13:11

## 2024-06-20 RX ADMIN — Medication 100 MCG: at 12:51

## 2024-06-20 RX ADMIN — SODIUM CHLORIDE, PRESERVATIVE FREE 10 ML: 5 INJECTION INTRAVENOUS at 08:11

## 2024-06-20 RX ADMIN — ASPIRIN 81 MG: 81 TABLET, COATED ORAL at 08:11

## 2024-06-20 RX ADMIN — FENTANYL CITRATE 25 MCG: 50 INJECTION, SOLUTION INTRAMUSCULAR; INTRAVENOUS at 11:13

## 2024-06-20 RX ADMIN — Medication 100 MCG: at 13:18

## 2024-06-20 RX ADMIN — Medication 100 MCG: at 12:43

## 2024-06-20 RX ADMIN — SODIUM CHLORIDE: 9 INJECTION, SOLUTION INTRAVENOUS at 20:08

## 2024-06-20 RX ADMIN — Medication 200 MCG: at 13:02

## 2024-06-20 RX ADMIN — IODIXANOL 86 ML: 270 INJECTION, SOLUTION INTRAVASCULAR at 12:56

## 2024-06-20 RX ADMIN — MIDAZOLAM 1 MG: 1 INJECTION INTRAMUSCULAR; INTRAVENOUS at 12:24

## 2024-06-20 ASSESSMENT — ENCOUNTER SYMPTOMS
ABDOMINAL DISTENTION: 0
ABDOMINAL PAIN: 0
COUGH: 0
CONSTIPATION: 0
DIARRHEA: 0
VOMITING: 0
STRIDOR: 0
SHORTNESS OF BREATH: 0
WHEEZING: 0
NAUSEA: 0

## 2024-06-20 NOTE — PROGRESS NOTES
150/64   Pulse: 53   Resp: 17   Temp:    SpO2: 95%        General:  Gen: normal habitus, NAD  HEENT: NCAT, mucosa moist  Cvs: RRR, S1 S2 normal  Resp: symmetric unlabored breathing  Abd: s/nd/nt  Ext: no edema  Skin: no lesions seen, warm and dry    Neuro:  Gen: awake and alert, oriented x3.   Lang/speech: no aphasia or dysarthria. Follows commands.  CN: PERRL, EOMI, VFF, V1-3 intact, face symmetric, lower quadrant temporal aspect vision loss hearing intact, shoulder shrug symmetric, tongue midline  Motor: grossly 5/5 UE and LE b/l  Sense: LT intact in all 4 ext.  Coord: FTN and HTS intact b/l  DTR: deferred  Gait: narrow base gait      LABS:   Reviewed.  Lab Results   Component Value Date    HGB 12.9 (L) 2024    WBC 4.8 2024     2024     2024    BUN 12 2024    CREATININE 0.8 2024    AST 19 2024    ALT 8 (L) 2024    MG 1.8 2024    APTT 27.1 2024    INR 0.9 2024      No results found for: \"COVID19\"    RADIOLOGY:   Images were personally reviewed includin. Moderate stenosis at the left common carotid artery origin.  Approximately  65% stenosis of the distal left common carotid artery, and up to  approximately 70% stenosis of the proximal to mid cervical left ICA.  3. Focal marked stenosis at the bilateral vertebral artery origins.  4. Moderate to severe stenosis at the left subclavian artery origin.  5. No significant stenosis or large vessel occlusion of the intracranial  arteries.     Brain MRI 2024  No acute infarct or other acute intracranial process.     ASSESSMENT:     Keven Iyer is a 69 y.o. right handed male who presents with left BRAO. He has 65% stenosis of the distal left common carotid artery, and up to approximately 70% stenosis of the proximal to mid cervical left ICA. He was loaded with 300 mg Plavix and given 81 mg ASA. Transferred to our facility for DSA and likely carotid artery  stenting. Currently NPO.        PLAN:     Continue with DAPT and statins   Plan for DSA with possible TF-BETTY this noon.     Case discussed with Dr. Romano attending.    Aristeo Marquez MD   Stroke, Neurocritical Care & Neurointervention  [facility]  [facility] Stroke [facility]  Electronically signed 6/20/2024 at 7:43 AM

## 2024-06-20 NOTE — CONSULTS
swelling, myalgias and neck pain.   Neurological:  Negative for dizziness, seizures, syncope, facial asymmetry, speech difficulty, weakness, light-headedness, numbness and headaches.   Psychiatric/Behavioral:  Negative for agitation, behavioral problems, confusion, decreased concentration, dysphoric mood, hallucinations and sleep disturbance. The patient is not nervous/anxious and is not hyperactive.      Past Histories & Current Inpatient Medications   Medications  Prior to Admission medications    Medication Sig Start Date End Date Taking? Authorizing Provider   aspirin 81 MG chewable tablet Take 1 tablet by mouth daily    Cynthia Watts MD   sildenafil (VIAGRA) 100 MG tablet Take 1 tablet by mouth as needed for Erectile Dysfunction    Cynthia Watts MD   hydroCHLOROthiazide (HYDRODIURIL) 25 MG tablet Take 1 tablet by mouth daily    Cynthia Watts MD   metFORMIN (GLUCOPHAGE) 1000 MG tablet Take 1 tablet by mouth 2 times daily (with meals)    Cynthia Watts MD   lisinopril (PRINIVIL;ZESTRIL) 10 MG tablet Take 1 tablet by mouth daily    Cynthia Watts MD   atorvastatin (LIPITOR) 80 MG tablet Take 1 tablet by mouth daily    Cynthia Watts MD    Scheduled Meds:   sodium chloride flush  5-40 mL IntraVENous 2 times per day    enoxaparin  40 mg SubCUTAneous Daily    rosuvastatin  40 mg Oral Nightly    [START ON 6/20/2024] clopidogrel  75 mg Oral Daily    [START ON 6/20/2024] aspirin  81 mg Oral Daily    [Held by provider] hydroCHLOROthiazide  25 mg Oral Daily    [Held by provider] lisinopril  10 mg Oral Daily    [Held by provider] metFORMIN  1,000 mg Oral BID WC     Continuous Infusions:   sodium chloride      sodium chloride 75 mL/hr at 06/19/24 1805     PRN Meds:.sodium chloride flush, sodium chloride, ondansetron **OR** ondansetron, polyethylene glycol, hydrALAZINE  Past Medical History   has a past medical history of CVA (cerebral vascular accident) (HCC), Diabetes mellitus  20 - 31 mmol/L 27  6/19/24 06:05   BUN,BUNPL 8 - 23 mg/dL 13  6/19/24 06:05   Creatinine 0.70 - 1.20 mg/dL 0.8  6/19/24 06:05   Anion Gap 9 - 16 mmol/L 11  6/19/24 06:05   Est, Glom Filt Rate >60 mL/min/1.73m2 >90  6/19/24 06:05   Glucose 74 - 99 mg/dL 119 (H)  6/19/24 06:05   POC Glucose 75 - 110 mg/dL 135 (H)  6/19/24 20:33   Calcium 8.6 - 10.4 mg/dL 9.2  6/19/24 06:05   Albumin/Globulin Ratio 1.0 - 2.5  2.0  6/19/24 06:05   Total Protein 6.6 - 8.7 g/dL 6.4 (L)  6/19/24 06:05   Albumin 3.5 - 5.2 g/dL 4.3  6/19/24 06:05   Alkaline Phosphatase 40 - 129 U/L 56  6/19/24 06:05   ALT 10 - 50 U/L 10  6/19/24 06:05   AST 10 - 50 U/L 20  6/19/24 06:05   Total Bilirubin 0.00 - 1.20 mg/dL 1.1  6/19/24 06:05   Hemoglobin A1C 4.0 - 6.0 % 7.1 (H)  6/19/24 06:05   eAG (mg/dL) mg/dL 157  6/19/24 06:05     Radiology Review:    MRI brain without contrast Result Date: 6/19/2024  No acute infarct or other acute intracranial process.     CTA HEAD NECK W CONTRAST Result Date: 6/18/2024  1. No acute intracranial abnormality.   2. Moderate stenosis at the left common carotid artery origin.  Approximately 65% stenosis of the distal left common carotid artery, and up to approximately 70% stenosis of the proximal to mid cervical left ICA.   3. Focal marked stenosis at the bilateral vertebral artery origins.   4. Moderate to severe stenosis at the left subclavian artery origin.   5. No significant stenosis or large vessel occlusion of the intracranial arteries.     XR CHEST PORTABLE Result Date: 6/18/2024  No acute process.     Summary & Plan     Keven Iyer is a 69 y.o. right handed male who presents with left BRAO. He has 65% stenosis of the distal left common carotid artery, and up to approximately 70% stenosis of the proximal to mid cervical left ICA. He was loaded with 300 mg Plavix and given 81 mg ASA. Transferred to our facility for DSA and likely carotid artery stenting. Currently NPO.          Impression:  Left BRAO, suspect

## 2024-06-20 NOTE — ANESTHESIA PROCEDURE NOTES
Arterial Line:    An arterial line was placed using ultrasound guidance, in the OR for the following indication(s): continuous blood pressure monitoring and blood sampling needed.    A  (size) (length), Arrow (type) catheter was placed, Seldinger technique used, into the left radial artery, secured by Tegaderm.  Anesthesia type: General    Events:  patient tolerated procedure well with no complications.6/20/2024 11:05 AM6/20/2024 11:08 AM  Anesthesiologist: Alexandre Washington MD  Performed: Anesthesiologist   Preanesthetic Checklist  Completed: patient identified, IV checked, site marked, risks and benefits discussed, surgical/procedural consents, equipment checked, pre-op evaluation, timeout performed, anesthesia consent given, oxygen available, monitors applied/VS acknowledged, fire risk safety assessment completed and verbalized and blood product R/B/A discussed and consented

## 2024-06-20 NOTE — PLAN OF CARE
Problem: Discharge Planning  Goal: Discharge to home or other facility with appropriate resources  6/20/2024 1839 by Zeenat Johnson RN  Outcome: Progressing  Flowsheets (Taken 6/20/2024 1400)  Discharge to home or other facility with appropriate resources: Identify barriers to discharge with patient and caregiver  6/20/2024 0502 by Valerie Jauregui RN  Outcome: Progressing     Problem: Safety - Adult  Goal: Free from fall injury  6/20/2024 1839 by Zeenat Johnson RN  Outcome: Progressing  6/20/2024 1058 by Thu Gong RN  Outcome: Progressing  6/20/2024 0502 by Valerie Jauregui RN  Outcome: Progressing     Problem: ABCDS Injury Assessment  Goal: Absence of physical injury  6/20/2024 1839 by Zeenat Johnson RN  Outcome: Progressing  6/20/2024 1058 by Thu Gong RN  Outcome: Progressing  6/20/2024 0502 by Valerie Jauregui RN  Outcome: Progressing     Problem: Chronic Conditions and Co-morbidities  Goal: Patient's chronic conditions and co-morbidity symptoms are monitored and maintained or improved  Outcome: Progressing  Flowsheets (Taken 6/20/2024 1400)  Care Plan - Patient's Chronic Conditions and Co-Morbidity Symptoms are Monitored and Maintained or Improved: Monitor and assess patient's chronic conditions and comorbid symptoms for stability, deterioration, or improvement

## 2024-06-20 NOTE — ANESTHESIA PRE PROCEDURE
06/20/2024 05:42 AM    BILITOT 0.8 06/20/2024 05:42 AM    ALKPHOS 55 06/20/2024 05:42 AM    AST 19 06/20/2024 05:42 AM    ALT 8 06/20/2024 05:42 AM       POC Tests:   Recent Labs     06/20/24  0730   POCGLU 129*       Coags:   Lab Results   Component Value Date/Time    PROTIME 11.7 06/18/2024 05:40 PM    INR 0.9 06/18/2024 05:40 PM    APTT 27.1 06/18/2024 05:40 PM       HCG (If Applicable): No results found for: \"PREGTESTUR\", \"PREGSERUM\", \"HCG\", \"HCGQUANT\"     ABGs: No results found for: \"PHART\", \"PO2ART\", \"DJC3EFH\", \"XXG7IFH\", \"BEART\", \"R8RILRMW\"     Type & Screen (If Applicable):  No results found for: \"LABABO\"    Drug/Infectious Status (If Applicable):  No results found for: \"HIV\", \"HEPCAB\"    COVID-19 Screening (If Applicable): No results found for: \"COVID19\"        Anesthesia Evaluation  Patient summary reviewed and Nursing notes reviewed   no history of anesthetic complications:   Airway: Mallampati: III       Mouth opening: > = 3 FB   Dental:    (+) poor dentition      Pulmonary:Negative Pulmonary ROS and normal exam                               Cardiovascular:  Exercise tolerance: good (>4 METS)  (+) hypertension:, hyperlipidemia              Stress test reviewed                Neuro/Psych:   (+) CVA:, headaches: migraine headaches             ROS comment: CVA. Carotid disease, stroke GI/Hepatic/Renal:   (+) GERD: well controlled          Endo/Other:    (+) DiabetesType II DM, well controlled, blood dyscrasia: anticoagulation therapy:..                 Abdominal:             Vascular:   + PVD, aortic or cerebral.        ROS comment: R CEA 2021, . Other Findings:             Anesthesia Plan      MAC     ASA 3         arterial line    Anesthetic plan and risks discussed with patient.    Use of blood products discussed with patient whom consented to blood products.    Plan discussed with CRNA.                    Alexandre Washington MD   6/20/2024

## 2024-06-20 NOTE — PROGRESS NOTES
[facility] Neurology   IN-PATIENT SERVICE   [facility]    Progress Note             Date:   6/20/2024  Patient name:  Keven Iyer  Date of admission:  6/19/2024  2:33 AM  MRN:   [id number]  Account:  [id number]  YOB: 1955  PCP:    Vernon Vincent DO  Room:   [address] [place] [address]  Code Status:    Full Code    Chief Complaint:     Acute vision loss    Interval hx:     The patient was seen and examined at bedside. Is vitally stable, alert and oriented x 4.   overnight spiked fever, concern for PE (tachycardic, hypoxic)    Brief History of Present Illness:     Keven Iyer is a 69 y.o. right handed male with a history of hypertension, hyperlipidemia, symptomatic right ICA status post carotid endarterectomy in January 2021 who presented on 6/16/2020 for to outlying facility with sudden onset vision loss in his left eye.  Patient's symptoms were ongoing for 24 hours prior to his arrival, was seen at an optometrist office and was diagnosed with an acute retinal branch artery occlusion.  CT angiogram showed moderate stenosis of left carotid artery.  Patient was loaded with 300 mg of Plavix and given 81 mg of aspirin.  Transferred to our facility for DSA and possible stenting.  Patient was seen and examined at approximately 2:50 AM this morning. Patient has lower quadrant temporal aspect vision loss. Otherwise no other neurological deficits.     Interval history:   MRI Brain wo contrast  DAPT ASA and plavix, Crestor  -Patient underwent DSA w/ left ICA stenting and balloon angioplasty.   -Transfer to [facility] after procedure.     Past Medical History:     Past Medical History:   Diagnosis Date    CVA (cerebral vascular accident) (HCC) 2021    Due to blockage    Diabetes mellitus (HCC)     HLD (hyperlipidemia)     Hypertension         Past Surgical History:     Past Surgical History:   Procedure Laterality Date    CAROTID ENDARTERECTOMY  01/04/2021    Krissy    ESOPHAGOGASTRODUODENOSCOPY   January 2021  -NPO past midnight for procedure today  -LDL 32  -A1C: 7.1%  -MRI brain: No acute infarct or other acute intracranial process.  -PT/OT    Follow-up further recommendations after discussing case with the attending.  The plan was discussed with the patient, patient's family and the medical staff.   Consultations:   IP CONSULT TO ENDOVASCULAR NEUROSURGERY    Patient is admitted as inpatient status because of co-morbidities listed above, severity of signs and symptoms as outlined, requirement for current medical therapies and most importantly because of direct risk to patient if care not provided in a hospital setting.    Vernon Alberts MD  Neurology Resident PGY-2  6/20/2024  7:33 PM    Copy sent to Vernon Hill DO

## 2024-06-20 NOTE — BRIEF OP NOTE
[facility]roke [facility]    NEUROENDOVASCULAR SERVICE: POST-OP NOTE: 6/20/2024    Pt Name: Keven Iyer  MRN: [id number]  YOB: 1955  Date of Procedure: 6/20/2024  Primary Care Physician: Vernon Vincent DO  Referring Physician:MD Laisha      Pre-Procedural Diagnosis: L ICA symptomatic stenosis   Post-Procedural Diagnosis:same s/p stenting and balloon angioplasty       Procedure Performed:cerebral angiogram with cervical carotid stenting and balloon angioplasty with distal embolic protection device    Surgeon:   Gely Romano MD    Fellow:  Rich Bullard MD and Aristeo Pulliam MD     Assisting Tech:  Adri Conn    PRE-PROCEDURAL EXAM:  Neurological exam performed and unchanged from initial H&P or consult      Anesthesia: MAC anesthesia  An Immediate re-assessment was completed prior to sedation, and it is determined to be safe to proceed.  Complications: none    Intra-Operative EXAM:  Patient sedated with unchanged limited neurological exam    EBL: < less than 50       Cc            Specimens: Were not Obtained  Contrast:     Visipaque 270 low osmolar 86 Cc             Fluoro: 40.3 min    Findings:  Please see dictated Radiology note for further details  L proximal ICA origin severe symptomatic stenosis greater than 75% stenosis by NASCET criteria  RCCA-ICA run demonstrated no significant stenosis  The above lesion was treated with 6Fr Cook, VTK, [place] Nav6, Ernesto 5x40mm (pre angioplasty), Wallstent 10x37, Ernesto 5x40mm (post angioplasty)  Post angioplasty, stenosis is less than 10%.            POST-PROCEDURAL EXAM :   Stable neurological Exam  Neurological exam performed and unchanged from initial H&P or consult    Closure:  right Angioseal 6   F        POST-PROCEDURAL MONITORING : see orders  Disposition: Neuro ICU      Recommendations:  Back to Neuro ICU  Do not bend right leg for 3 hours.  Groin checks per protocol.  Peripheral pulse checks per protocol.  SBP

## 2024-06-20 NOTE — PROGRESS NOTES
1810 pt stood at side of bed to void. BP dropped into 60s and Passed out. Placed back in bed. Resident bedside. Trendelenberg position. Pt woke up BP up into 200s. BP back down to order parameters on own. See charting.

## 2024-06-20 NOTE — CARE COORDINATION
PHQ-9  Met with pt with wife present with consent.  Pt lives with wife. Has 1 dtr and 2 grandchildren.  Pt denies having any SI or feelings of depression     Patient Health Questionnaire-9 (PHQ-9)    Over the last 2 weeks, how often have you been bothered by any of the following problems?    1. Little Interest or pleasure in doing things?   [x] Not at all  [] Several Days  [] More than half the day  []  Nearly every day    2. Feeling down, depressed or hopeless?    [x] Not at all  [] Several Days  [] More than half the day  []  Nearly every day    3. Trouble falling or staying asleep, or sleeping too much?   [x] Not at all  [] Several Days  [] More than half the day  []  Nearly every day    4. Feeling tired or having little energy?   [x] Not at all  [] Several Days  [] More than half the day  []  Nearly every day    5. Poor apettite or overeating?   [x] Not at all  [] Several Days  [] More than half the day  []  Nearly every day    6. Feeling bad about yourself-or that you are a failure or have let yourself or your family down?   [x] Not at all  [] Several Days  [] More than half the day  []  Nearly every day    7. Trouble concentrating on things, such as reading the newspaper or watching television?   [x] Not at all  [] Several Days  [] More than half the day  []  Nearly every day    8. Moving or speaking so slowly that other people could have noticed? Or the opposite-being so fidgety or restless that you have been moving around a lot more than usual?   [x] Not at all  [] Several Days  [] More than half the day  []  Nearly every day    9. Thoughts that you would be better off dead or of hurting yourself in some way?   [x] Not at all  [] Several Days  [] More than half the day  []  Nearly every day    Total Score: 0    If you checked off any problems, how difficult have these problems made it for you to do your work, take care of things at home, or get along with other people?   [x] Not difficult at all  [] Somewhat

## 2024-06-20 NOTE — PLAN OF CARE
Problem: Safety - Adult  Goal: Free from fall injury  6/20/2024 1058 by Thu Gong, RN  Outcome: Progressing     Problem: ABCDS Injury Assessment  Goal: Absence of physical injury  6/20/2024 1058 by Thu Gong, RN  Outcome: Progressing

## 2024-06-20 NOTE — H&P
Neuro ICU History & Physical    Patient Name: Keven Iyer  Patient : 1955  Room/Bed: 0118/0118-01  Code Status: FULL  Allergies:   Allergies   Allergen Reactions    Codeine        CHIEF COMPLAINT     Visual loss in his left eye    HPI    History Obtained From: Patient    Keven Iyer is a 69 y.o. right handed male with a history of hypertension, hyperlipidemia, symptomatic right ICA status post carotid endarterectomy in 2021 who presented on 2020 for to [facility] facility with sudden onset vision loss in his left eye.  Patient's symptoms were ongoing for 24 hours prior to his arrival, was seen at an optometrist office and was diagnosed with an acute retinal branch artery occlusion.  CT angiogram showed moderate stenosis of left carotid artery.  Patient was loaded with 300 mg of Plavix and given 81 mg of aspirin.  Patient then had DSA with L ICA  stenting and balloon angioplasty.  Patient has been experiencing blood pressure fluctuations which started during the procedure, trying to maintain blood pressure goal -1 40, given bolus of fluid when the patient came to the ICU and started him on 125 mL/hour NS..  Otherwise post tenting patient continues to be nonfocal and will monitor closely for 24 hours      Admitted to ICU From: IR  Reason for ICU Admission: Post left ICA stenting       PATIENT HISTORY   Past Medical History:        Diagnosis Date    CVA (cerebral vascular accident) (HCC)     Due to blockage    Diabetes mellitus (HCC)     HLD (hyperlipidemia)     Hypertension        Past Surgical History:        Procedure Laterality Date    CAROTID ENDARTERECTOMY  2021    Krissy    ESOPHAGOGASTRODUODENOSCOPY  2024    -elva's    UPPER GASTROINTESTINAL ENDOSCOPY N/A 2024    ESOPHAGOGASTRODUODENOSCOPY BIOPSY performed by Mahsa Colon MD at [facility] OR       Social History:   Social History     Socioeconomic History    Marital status:      Spouse  negative for abdominal pain, nausea, vomiting, diarrhea, or constipation    GENITOURINARY: negative for incontinence or retention    MUSCULOSKELETAL: negative for neck or back pain, negative for extremity pain   NEUROLOGICAL: Negative for seizures, headaches, weakness, numbness, confusion, aphasia, dysarthria    PSYCHIATRIC: negative for agitation, hallucination, SI/HI   SKIN Negative for spontaneous contusions, rashes, or lesions      PHYSICAL EXAM:     BP (!) 92/54   Pulse 58   Temp 97.7 °F (36.5 °C)   Resp 10   Ht 1.651 m (5' 5\")   Wt 66.6 kg (146 lb 13.2 oz)   SpO2 97%   BMI 24.43 kg/m²     PHYSICAL EXAM:  GENERAL Appears comfortable and in no distress   HEENT NC/ AT   HEART S1 and S2 heard; palpation of pulses: radial pulse   NECK Supple and no bruits heard   MENTAL STATUS: Alert, oriented, intact memory, no confusion, normal speech, normal language, no hallucination or delusion   CRANIAL NERVES: II     -     lower quadrant temporal aspect vision los ,  III,IV,VI -  PERR, EOMs full, no ptosis  V     -     Normal facial sensation   VII    -     Normal facial symmetry  VIII   -     Intact hearing   IX,X -     Symmetrical palate  XI    -     Symmetrical shoulder shrug  XII   -     Midline tongue, no atrophy   MOTOR FUNCTION: RUE: Significant for good strength of grade 5/5 in proximal and distal muscle groups   LUE: Significant for good strength of grade 5/5 in proximal and distal muscle groups   RLE: Significant for good strength of grade 5/5 in proximal and distal muscle groups   LLE: Significant for good strength of grade 5/5 in proximal and distal muscle groups       Normal bulk, normal tone and no involuntary movements, no tremor   SENSORY FUNCTION: Normal touch, normal pinprick, normal vibration, normal proprioception   CEREBELLAR FUNCTION: Intact fine motor control over upper limbs and lower limbs   REFLEX FUNCTION: Symmetric in upper and lower extremities, no Babinski sign   STATION and GAIT deferred

## 2024-06-21 LAB
ALBUMIN SERPL-MCNC: 3.4 G/DL (ref 3.5–5.2)
ALBUMIN/GLOB SERPL: 2 {RATIO} (ref 1–2.5)
ALP SERPL-CCNC: 46 U/L (ref 40–129)
ALT SERPL-CCNC: 13 U/L (ref 10–50)
ANION GAP SERPL CALCULATED.3IONS-SCNC: 4 MMOL/L (ref 9–16)
AST SERPL-CCNC: 18 U/L (ref 10–50)
BILIRUB SERPL-MCNC: 1 MG/DL (ref 0–1.2)
BUN SERPL-MCNC: 9 MG/DL (ref 8–23)
CALCIUM SERPL-MCNC: 7.8 MG/DL (ref 8.6–10.4)
CHLORIDE SERPL-SCNC: 108 MMOL/L (ref 98–107)
CO2 SERPL-SCNC: 27 MMOL/L (ref 20–31)
CREAT SERPL-MCNC: 0.6 MG/DL (ref 0.7–1.2)
ERYTHROCYTE [DISTWIDTH] IN BLOOD BY AUTOMATED COUNT: 13.1 % (ref 11.8–14.4)
GFR, ESTIMATED: >90 ML/MIN/1.73M2
GLUCOSE SERPL-MCNC: 128 MG/DL (ref 74–99)
HCT VFR BLD AUTO: 24 % (ref 40.7–50.3)
HCT VFR BLD AUTO: 25.5 % (ref 40.7–50.3)
HGB BLD-MCNC: 7.9 G/DL (ref 13–17)
HGB BLD-MCNC: 8.2 G/DL (ref 13–17)
MCH RBC QN AUTO: 28 PG (ref 25.2–33.5)
MCHC RBC AUTO-ENTMCNC: 32.2 G/DL (ref 28.4–34.8)
MCV RBC AUTO: 87 FL (ref 82.6–102.9)
NRBC BLD-RTO: 0 PER 100 WBC
PLATELET # BLD AUTO: 215 K/UL (ref 138–453)
PMV BLD AUTO: 10.2 FL (ref 8.1–13.5)
POTASSIUM SERPL-SCNC: 3.6 MMOL/L (ref 3.7–5.3)
PROT SERPL-MCNC: 5 G/DL (ref 6.6–8.7)
RBC # BLD AUTO: 2.93 M/UL (ref 4.21–5.77)
SODIUM SERPL-SCNC: 139 MMOL/L (ref 136–145)
WBC OTHER # BLD: 7.3 K/UL (ref 3.5–11.3)

## 2024-06-21 PROCEDURE — 6370000000 HC RX 637 (ALT 250 FOR IP)

## 2024-06-21 PROCEDURE — 99233 SBSQ HOSP IP/OBS HIGH 50: CPT | Performed by: STUDENT IN AN ORGANIZED HEALTH CARE EDUCATION/TRAINING PROGRAM

## 2024-06-21 PROCEDURE — 2580000003 HC RX 258

## 2024-06-21 PROCEDURE — 6360000002 HC RX W HCPCS: Performed by: STUDENT IN AN ORGANIZED HEALTH CARE EDUCATION/TRAINING PROGRAM

## 2024-06-21 PROCEDURE — 99233 SBSQ HOSP IP/OBS HIGH 50: CPT | Performed by: PSYCHIATRY & NEUROLOGY

## 2024-06-21 PROCEDURE — 85018 HEMOGLOBIN: CPT

## 2024-06-21 PROCEDURE — 85014 HEMATOCRIT: CPT

## 2024-06-21 PROCEDURE — 97530 THERAPEUTIC ACTIVITIES: CPT

## 2024-06-21 PROCEDURE — 6370000000 HC RX 637 (ALT 250 FOR IP): Performed by: PSYCHIATRY & NEUROLOGY

## 2024-06-21 PROCEDURE — 97161 PT EVAL LOW COMPLEX 20 MIN: CPT

## 2024-06-21 PROCEDURE — 80053 COMPREHEN METABOLIC PANEL: CPT

## 2024-06-21 PROCEDURE — 85027 COMPLETE CBC AUTOMATED: CPT

## 2024-06-21 PROCEDURE — 2000000000 HC ICU R&B

## 2024-06-21 PROCEDURE — 36415 COLL VENOUS BLD VENIPUNCTURE: CPT

## 2024-06-21 RX ORDER — HYDRALAZINE HYDROCHLORIDE 20 MG/ML
10 INJECTION INTRAMUSCULAR; INTRAVENOUS
Status: DISCONTINUED | OUTPATIENT
Start: 2024-06-21 | End: 2024-06-22 | Stop reason: HOSPADM

## 2024-06-21 RX ADMIN — SODIUM CHLORIDE: 9 INJECTION, SOLUTION INTRAVENOUS at 04:22

## 2024-06-21 RX ADMIN — HYDRALAZINE HYDROCHLORIDE 10 MG: 20 INJECTION INTRAMUSCULAR; INTRAVENOUS at 20:19

## 2024-06-21 RX ADMIN — SODIUM CHLORIDE: 9 INJECTION, SOLUTION INTRAVENOUS at 20:42

## 2024-06-21 RX ADMIN — ROSUVASTATIN CALCIUM 40 MG: 20 TABLET, FILM COATED ORAL at 20:14

## 2024-06-21 RX ADMIN — POTASSIUM BICARBONATE 40 MEQ: 782 TABLET, EFFERVESCENT ORAL at 06:59

## 2024-06-21 RX ADMIN — CLOPIDOGREL BISULFATE 75 MG: 75 TABLET ORAL at 08:34

## 2024-06-21 RX ADMIN — ASPIRIN 81 MG: 81 TABLET, COATED ORAL at 08:34

## 2024-06-21 NOTE — PROGRESS NOTES
Endovascular Neurosurgery Progress Note       Reason for evaluation: Symptomatic 70% stenosis of the proximal to mid cervical left ICA, Left BRAO     SUBJECTIVE:   NAEO. Plan for DSA with possible TF-BETTY this noon.     History of Chief Complaint:      Keven Iyer is a 69 y.o. right handed male with a history of hypertension, hyperlipidemia, symptomatic right ICA status post carotid endarterectomy in January 2021 who [facility] sudden onset vision loss in his left eye.  Patient's symptoms were ongoing for 24 hours prior to his arrival, was seen at an optometrist office and was diagnosed with an acute retinal branch artery occlusion.  CT angiogram showed moderate stenosis of left carotid artery.  Patient was loaded with 300 mg of Plavix and given 81 mg of aspirin.  Transferred to our facility for DSA and possible stenting.  Patient was seen and examined at approximately 2:50 AM this morning. Patient has lower quadrant temporal aspect vision loss. Otherwise no other neurological deficits       Allergies  is allergic to codeine.  Medications  Prior to Admission medications    Medication Sig Start Date End Date Taking? Authorizing Provider   aspirin 81 MG chewable tablet Take 1 tablet by mouth daily    Cynthia Watts MD   sildenafil (VIAGRA) 100 MG tablet Take 1 tablet by mouth as needed for Erectile Dysfunction    Cynthia Watts MD   hydroCHLOROthiazide (HYDRODIURIL) 25 MG tablet Take 1 tablet by mouth daily    Cynthia Watts MD   metFORMIN (GLUCOPHAGE) 1000 MG tablet Take 1 tablet by mouth 2 times daily (with meals)    Cynthia Watts MD   lisinopril (PRINIVIL;ZESTRIL) 10 MG tablet Take 1 tablet by mouth daily    Cynthia Watts MD   atorvastatin (LIPITOR) 80 MG tablet Take 1 tablet by mouth daily    Cynthia Watts MD    Scheduled Meds:   sodium chloride flush  5-40 mL IntraVENous 2 times per day    sodium chloride flush  5-40  mL IntraVENous 2 times per day    [Held by provider] enoxaparin  40 mg SubCUTAneous Daily    rosuvastatin  40 mg Oral Nightly    clopidogrel  75 mg Oral Daily    aspirin  81 mg Oral Daily    [Held by provider] hydroCHLOROthiazide  25 mg Oral Daily    [Held by provider] lisinopril  10 mg Oral Daily    [Held by provider] metFORMIN  1,000 mg Oral BID WC     Continuous Infusions:   sodium chloride      sodium chloride      sodium chloride 150 mL/hr at 06/21/24 0659     PRN Meds:.sodium chloride flush, sodium chloride, acetaminophen, ondansetron **OR** ondansetron, hydrALAZINE, sodium chloride flush, sodium chloride, ondansetron **OR** ondansetron, polyethylene glycol  Past Medical History   has a past medical history of CVA (cerebral vascular accident) (HCC), Diabetes mellitus (HCC), HLD (hyperlipidemia), and Hypertension.  Past Surgical History   has a past surgical history that includes Carotid endarterectomy (01/04/2021); Upper gastrointestinal endoscopy (N/A, 03/28/2024); and Esophagogastroduodenoscopy (03/28/2024).  Social History   reports that he has quit smoking. His smoking use included cigarettes. He started smoking about 37 years ago. He has never used smokeless tobacco.   reports current alcohol use.   reports no history of drug use.  Family History  family history includes Cancer in his father; Heart Disease in his mother; Stroke in his brother and sister.    Review of Systems:  CONSTITUTIONAL:  negative for fevers, chills, fatigue and malaise    EYES:  negative for double vision, blurred vision and photophobia     HEENT:  negative for tinnitus, epistaxis and sore throat    RESPIRATORY:  negative for cough, shortness of breath, wheezing    CARDIOVASCULAR:  negative for chest pain, palpitations, syncope, edema    GASTROINTESTINAL:  negative for nausea, vomiting    GENITOURINARY:  negative for incontinence    MUSCULOSKELETAL:  negative for neck or back pain    NEUROLOGICAL:  Negative for weakness and

## 2024-06-21 NOTE — PROGRESS NOTES
Endovascular Neurosurgery Progress Note       Reason for evaluation: Symptomatic 70% stenosis of the proximal to mid cervical left ICA, Left BRAO     SUBJECTIVE:   NAEO. S/P endovascular left BETTY.     Had some orthostatic changes, patient otherwise with non focal neuro exam.    History of Chief Complaint:      Keven Iyer is a 69 y.o. right handed male with a history of hypertension, hyperlipidemia, symptomatic right ICA status post carotid endarterectomy in January 2021 who presented on 6/16/2020 for to outlying facility with sudden onset vision loss in his left eye.  Patient's symptoms were ongoing for 24 hours prior to his arrival, was seen at an optometrist office and was diagnosed with an acute retinal branch artery occlusion.  CT angiogram showed moderate stenosis of left carotid artery.  Patient was loaded with 300 mg of Plavix and given 81 mg of aspirin.  Transferred to our facility for DSA and possible stenting.  Patient was seen and examined at approximately 2:50 AM this morning. Patient has lower quadrant temporal aspect vision loss. Otherwise no other neurological deficits       Allergies  is allergic to codeine.  Medications  Prior to Admission medications    Medication Sig Start Date End Date Taking? Authorizing Provider   aspirin 81 MG chewable tablet Take 1 tablet by mouth daily    Cynthia Watts MD   sildenafil (VIAGRA) 100 MG tablet Take 1 tablet by mouth as needed for Erectile Dysfunction    Cynthia Watts MD   hydroCHLOROthiazide (HYDRODIURIL) 25 MG tablet Take 1 tablet by mouth daily    Cynthia Watts MD   metFORMIN (GLUCOPHAGE) 1000 MG tablet Take 1 tablet by mouth 2 times daily (with meals)    Cynthia Watts MD   lisinopril (PRINIVIL;ZESTRIL) 10 MG tablet Take 1 tablet by mouth daily    Cynthia Watts MD   atorvastatin (LIPITOR) 80 MG tablet Take 1 tablet by mouth daily    Cynthia Watts MD    Scheduled Meds:   sodium chloride flush  5-40 mL  proximal to mid cervical left ICA. He was loaded with 300 mg Plavix and given 81 mg ASA. Transferred to our facility for DSA and likely carotid artery stenting. Currently NPO.        PLAN:     Continue with DAPT and statins   S/P left BETTY  BP goal normotensive  Follow up Dr Han in 1 month after discharge. Follow up with Dr. Romano 3-4 months after discharge.     Case discussed with Dr. Romano attending.    RUTHY Slaughter MD   Stroke, Neurocritical Care & Neurointervention  [facility]  [facility]  [facility]  Electronically signed 6/21/2024 at 12:41 PM

## 2024-06-21 NOTE — PROGRESS NOTES
Neuro ICU Progress Note    Patient Name: Keven Iyer  Patient : 1955  Room/Bed: 0118/0118-01  Code Status: FULL  Allergies:   Allergies   Allergen Reactions    Codeine        CHIEF COMPLAINT     Visual loss in his left eye    HPI    History Obtained From: Patient    Keven Iyer is a 69 y.o. right handed male with a history of hypertension, hyperlipidemia, symptomatic right ICA status post carotid endarterectomy in 2021 who presented on 2024 for to [facility] facility with sudden onset vision loss in his left eye.  Patient's symptoms were ongoing for 24 hours prior to his arrival, was seen at an optometrist office and was diagnosed with an acute retinal branch artery occlusion.  CT angiogram showed moderate stenosis of left carotid artery.  Patient was loaded with 300 mg of Plavix and given 81 mg of aspirin.  Patient then had DSA with L ICA  stenting and balloon angioplasty.  Patient has been experiencing blood pressure fluctuations which started during the procedure, trying to maintain blood pressure goal -1 40, given bolus of fluid when the patient came to the ICU and started him on 125 mL/hour NS. Otherwise post tenting patient continues to be nonfocal and will monitor closely for 24 hours    Interval: Hb dropped to 8.2 this morning, potassium 3.6- replaced.  Groin site tender but appears not to have a obvious hematoma otherwise neurologically nonfocal.  Will see maintain blood pressure goal, but did have some orthostatic changes.  Increase fluids to 150 cc/hr.  Held prophylactic dose Lovenox. To repeat Hb at noon    Admitted to ICU From: IR  Reason for ICU Admission: Post left ICA stenting       PATIENT HISTORY   Past Medical History:        Diagnosis Date    CVA (cerebral vascular accident) (HCC)     Due to blockage    Diabetes mellitus (HCC)     HLD (hyperlipidemia)     Hypertension        Past Surgical History:        Procedure Laterality Date    CAROTID

## 2024-06-21 NOTE — PLAN OF CARE
Problem: Discharge Planning  Goal: Discharge to home or other facility with appropriate resources  6/20/2024 2308 by Ronel Jones RN  Outcome: Progressing  Flowsheets (Taken 6/20/2024 2000)  Discharge to home or other facility with appropriate resources: Identify barriers to discharge with patient and caregiver  6/20/2024 1839 by Zeenat Johnson RN  Outcome: Progressing  Flowsheets (Taken 6/20/2024 1400)  Discharge to home or other facility with appropriate resources: Identify barriers to discharge with patient and caregiver     Problem: Safety - Adult  Goal: Free from fall injury  6/20/2024 2308 by Ronel Jones RN  Outcome: Progressing  Flowsheets (Taken 6/20/2024 2000)  Free From Fall Injury: Instruct family/caregiver on patient safety  6/20/2024 1839 by Zeenat Johnson RN  Outcome: Progressing  6/20/2024 1058 by Thu Gong RN  Outcome: Progressing     Problem: ABCDS Injury Assessment  Goal: Absence of physical injury  6/20/2024 2308 by Ronel Jones RN  Outcome: Progressing  Flowsheets (Taken 6/20/2024 2000)  Absence of Physical Injury: Implement safety measures based on patient assessment  6/20/2024 1839 by Zeenat Johnson RN  Outcome: Progressing  6/20/2024 1058 by Thu Gong RN  Outcome: Progressing     Problem: Chronic Conditions and Co-morbidities  Goal: Patient's chronic conditions and co-morbidity symptoms are monitored and maintained or improved  6/20/2024 2308 by Ronel Jones RN  Outcome: Progressing  Flowsheets (Taken 6/20/2024 2000)  Care Plan - Patient's Chronic Conditions and Co-Morbidity Symptoms are Monitored and Maintained or Improved: Monitor and assess patient's chronic conditions and comorbid symptoms for stability, deterioration, or improvement  6/20/2024 1839 by Zeenat Johnson RN  Outcome: Progressing  Flowsheets (Taken 6/20/2024 1400)  Care Plan - Patient's Chronic Conditions and Co-Morbidity Symptoms are Monitored and Maintained or Improved: Monitor and assess patient's

## 2024-06-21 NOTE — ANESTHESIA POSTPROCEDURE EVALUATION
Department of Anesthesiology  Postprocedure Note    Patient: Keven Iyer  MRN: [id number]  YOB: 1955  Date of evaluation: 6/21/2024    Procedure Summary       Date: 06/20/24 Room / Location: [facility] Procedures    Anesthesia Start: 1058 Anesthesia Stop: 1352    Procedure: IR ANGIOGRAM CAROTID CEREBRAL BILATERAL Diagnosis: (carotid stent)    Scheduled Providers: Bruna Holland APRN - CRNA Responsible Provider: Alexandre Washington MD    Anesthesia Type: MAC ASA Status: 3            Anesthesia Type: No value filed.    Brittanie Phase I:      Brittanie Phase II:      Anesthesia Post Evaluation    Patient location during evaluation: bedside  Patient participation: complete - patient participated  Level of consciousness: awake  Airway patency: patent  Nausea & Vomiting: no nausea and no vomiting  Cardiovascular status: hemodynamically stable  Respiratory status: acceptable  Hydration status: stable  Comments: BP (!) 113/46   Pulse 52   Temp 98.5 °F (36.9 °C) (Oral)   Resp 14   Ht 1.651 m (5' 5\")   Wt 66.6 kg (146 lb 13.2 oz)   SpO2 99%   BMI 24.43 kg/m²           No notable events documented.

## 2024-06-21 NOTE — PROGRESS NOTES
Physical Therapy  Facility/Department: [address] NEURO ICU  Physical Therapy Initial Assessment    Name: Keven Iyer  : 1955  MRN: [id number]  Date of Service: 2024    Discharge Recommendations:  No therapy recommended at discharge   PT Equipment Recommendations  Equipment Needed: No      Patient Diagnosis(es): There were no encounter diagnoses.  Past Medical History:  has a past medical history of CVA (cerebral vascular accident) (HCC), Diabetes mellitus (HCC), HLD (hyperlipidemia), and Hypertension.  Past Surgical History:  has a past surgical history that includes Carotid endarterectomy (2021); Upper gastrointestinal endoscopy (N/A, 2024); and Esophagogastroduodenoscopy (2024).    Assessment   Body Structures, Functions, Activity Limitations Requiring Skilled Therapeutic Intervention: Decreased functional mobility ;Decreased balance  Assessment: Pt with mild mobility deficits requiring SBA to ambulate 250 feet with no AD.  Pt demonstrates slow, guarded gait.  Pt reports impaired left lower quadrant visual field cut- educated to scan environment during ambulation.  Pt would benefit from assistance with mobility upon discharge, pt reports supportive spouse is able to assist as needed.  Pt would benefit from additional PT during inpatient hospital stay to assist in return to independent PLOF.  Therapy Prognosis: Good  Decision Making: Low Complexity  Requires PT Follow-Up: Yes  Activity Tolerance  Activity Tolerance: Patient tolerated treatment well     Plan   Physical Therapy Plan  General Plan: 3-5 times per week  Current Treatment Recommendations: Strengthening, Balance training, Functional mobility training, Transfer training, Gait training, Safety education & training, Home exercise program, Therapeutic activities, Patient/Caregiver education & training, Stair training, Endurance training  Safety Devices  Type of Devices: Left in bed, Call light within reach, Nurse notified, Gait

## 2024-06-22 VITALS
WEIGHT: 146.83 LBS | RESPIRATION RATE: 15 BRPM | DIASTOLIC BLOOD PRESSURE: 49 MMHG | SYSTOLIC BLOOD PRESSURE: 113 MMHG | OXYGEN SATURATION: 100 % | TEMPERATURE: 98.4 F | BODY MASS INDEX: 24.46 KG/M2 | HEART RATE: 73 BPM | HEIGHT: 65 IN

## 2024-06-22 LAB
ALBUMIN SERPL-MCNC: 3.5 G/DL (ref 3.5–5.2)
ALBUMIN/GLOB SERPL: 2 {RATIO} (ref 1–2.5)
ALP SERPL-CCNC: 49 U/L (ref 40–129)
ALT SERPL-CCNC: 13 U/L (ref 10–50)
ANION GAP SERPL CALCULATED.3IONS-SCNC: 9 MMOL/L (ref 9–16)
AST SERPL-CCNC: 18 U/L (ref 10–50)
BILIRUB SERPL-MCNC: 0.8 MG/DL (ref 0–1.2)
BUN SERPL-MCNC: 6 MG/DL (ref 8–23)
CALCIUM SERPL-MCNC: 7.9 MG/DL (ref 8.6–10.4)
CHLORIDE SERPL-SCNC: 110 MMOL/L (ref 98–107)
CO2 SERPL-SCNC: 23 MMOL/L (ref 20–31)
CREAT SERPL-MCNC: 0.7 MG/DL (ref 0.7–1.2)
ERYTHROCYTE [DISTWIDTH] IN BLOOD BY AUTOMATED COUNT: 13.3 % (ref 11.8–14.4)
GFR, ESTIMATED: >90 ML/MIN/1.73M2
GLUCOSE SERPL-MCNC: 143 MG/DL (ref 74–99)
HCT VFR BLD AUTO: 24.9 % (ref 40.7–50.3)
HGB BLD-MCNC: 8 G/DL (ref 13–17)
MAGNESIUM SERPL-MCNC: 1.7 MG/DL (ref 1.6–2.4)
MCH RBC QN AUTO: 27.9 PG (ref 25.2–33.5)
MCHC RBC AUTO-ENTMCNC: 32.1 G/DL (ref 28.4–34.8)
MCV RBC AUTO: 86.8 FL (ref 82.6–102.9)
NRBC BLD-RTO: 0 PER 100 WBC
PLATELET # BLD AUTO: 203 K/UL (ref 138–453)
PMV BLD AUTO: 10.4 FL (ref 8.1–13.5)
POTASSIUM SERPL-SCNC: 3.5 MMOL/L (ref 3.7–5.3)
PROT SERPL-MCNC: 5.3 G/DL (ref 6.6–8.7)
RBC # BLD AUTO: 2.87 M/UL (ref 4.21–5.77)
SODIUM SERPL-SCNC: 142 MMOL/L (ref 136–145)
WBC OTHER # BLD: 5.8 K/UL (ref 3.5–11.3)

## 2024-06-22 PROCEDURE — 2580000003 HC RX 258

## 2024-06-22 PROCEDURE — 80053 COMPREHEN METABOLIC PANEL: CPT

## 2024-06-22 PROCEDURE — 6370000000 HC RX 637 (ALT 250 FOR IP): Performed by: PSYCHIATRY & NEUROLOGY

## 2024-06-22 PROCEDURE — 85027 COMPLETE CBC AUTOMATED: CPT

## 2024-06-22 PROCEDURE — 99232 SBSQ HOSP IP/OBS MODERATE 35: CPT | Performed by: STUDENT IN AN ORGANIZED HEALTH CARE EDUCATION/TRAINING PROGRAM

## 2024-06-22 PROCEDURE — 2580000003 HC RX 258: Performed by: PSYCHIATRY & NEUROLOGY

## 2024-06-22 PROCEDURE — 6360000002 HC RX W HCPCS: Performed by: STUDENT IN AN ORGANIZED HEALTH CARE EDUCATION/TRAINING PROGRAM

## 2024-06-22 PROCEDURE — 99233 SBSQ HOSP IP/OBS HIGH 50: CPT | Performed by: PSYCHIATRY & NEUROLOGY

## 2024-06-22 PROCEDURE — 83735 ASSAY OF MAGNESIUM: CPT

## 2024-06-22 RX ORDER — CLOPIDOGREL BISULFATE 75 MG/1
75 TABLET ORAL DAILY
Qty: 30 TABLET | Refills: 0 | Status: SHIPPED | OUTPATIENT
Start: 2024-06-23

## 2024-06-22 RX ADMIN — SODIUM CHLORIDE 100 ML/HR: 9 INJECTION, SOLUTION INTRAVENOUS at 04:40

## 2024-06-22 RX ADMIN — ASPIRIN 81 MG: 81 TABLET, COATED ORAL at 09:25

## 2024-06-22 RX ADMIN — SODIUM CHLORIDE, PRESERVATIVE FREE 10 ML: 5 INJECTION INTRAVENOUS at 09:25

## 2024-06-22 RX ADMIN — CLOPIDOGREL BISULFATE 75 MG: 75 TABLET ORAL at 09:25

## 2024-06-22 RX ADMIN — HYDRALAZINE HYDROCHLORIDE 10 MG: 20 INJECTION INTRAMUSCULAR; INTRAVENOUS at 05:36

## 2024-06-22 RX ADMIN — HYDRALAZINE HYDROCHLORIDE 10 MG: 20 INJECTION INTRAMUSCULAR; INTRAVENOUS at 04:13

## 2024-06-22 NOTE — PLAN OF CARE
Some orthostatic changes when rising to bed side commode but patient denies symptoms. Left radial art line increasingly dampened and positional. Recommend using NIBP for BP readings.  Problem: Discharge Planning  Goal: Discharge to home or other facility with appropriate resources  Outcome: Progressing     Problem: Safety - Adult  Goal: Free from fall injury  Outcome: Progressing     Problem: ABCDS Injury Assessment  Goal: Absence of physical injury  Outcome: Progressing     Problem: Chronic Conditions and Co-morbidities  Goal: Patient's chronic conditions and co-morbidity symptoms are monitored and maintained or improved  Outcome: Progressing

## 2024-06-22 NOTE — PLAN OF CARE
Problem: Discharge Planning  Goal: Discharge to home or other facility with appropriate resources  6/22/2024 1526 by Alan Bowers RN  Outcome: Completed  6/22/2024 1312 by Alan Bowers RN  Outcome: Progressing  6/22/2024 0705 by Radha White RN  Outcome: Progressing     Problem: Safety - Adult  Goal: Free from fall injury  6/22/2024 1526 by Alan Bowers RN  Outcome: Completed  6/22/2024 1312 by Alan Bowers RN  Outcome: Progressing  6/22/2024 0705 by Radha White RN  Outcome: Progressing     Problem: ABCDS Injury Assessment  Goal: Absence of physical injury  6/22/2024 1526 by Alan Bowers RN  Outcome: Completed  6/22/2024 1312 by Alan Bowers RN  Outcome: Progressing  6/22/2024 0705 by Radha White RN  Outcome: Progressing     Problem: Chronic Conditions and Co-morbidities  Goal: Patient's chronic conditions and co-morbidity symptoms are monitored and maintained or improved  6/22/2024 1526 by Alan Bowers RN  Outcome: Completed  6/22/2024 1312 by Alan Bowers RN  Outcome: Progressing  6/22/2024 0705 by Radha White RN  Outcome: Progressing

## 2024-06-22 NOTE — PLAN OF CARE
Problem: Discharge Planning  Goal: Discharge to home or other facility with appropriate resources  6/22/2024 1312 by Alan Bowers RN  Outcome: Progressing  6/22/2024 0705 by Radha White RN  Outcome: Progressing     Problem: Safety - Adult  Goal: Free from fall injury  6/22/2024 1312 by Alan Bowers RN  Outcome: Progressing  6/22/2024 0705 by Radha White RN  Outcome: Progressing     Problem: ABCDS Injury Assessment  Goal: Absence of physical injury  6/22/2024 1312 by Alan Bowers RN  Outcome: Progressing  6/22/2024 0705 by Radha White RN  Outcome: Progressing     Problem: Chronic Conditions and Co-morbidities  Goal: Patient's chronic conditions and co-morbidity symptoms are monitored and maintained or improved  6/22/2024 1312 by Alan Bowers RN  Outcome: Progressing  6/22/2024 0705 by Radha White RN  Outcome: Progressing

## 2024-06-22 NOTE — DISCHARGE INSTRUCTIONS
Do not resume taking lisinopril or hydrochlorothiazide until reevaluated by PCP/neuroendovascular team outpatient    Avoid strenuous activity and/or lifting greater than 5 pounds for the next 2 weeks until endovascular outpatient follow-up    Please take all medications as prescribed. Please follow up with your primary care physician by calling today for the first available appointment. If you do not have a primary care physician, please contact a physician or clinic listed below today to establish care. Please return to the emergency department sooner if you develop uncontrolled fevers, uncontrolled  vomiting, or any other concerns.

## 2024-06-22 NOTE — PROGRESS NOTES
Daily Progress Note  Neuro Critical Care    Patient Name: Keven Iyer  Patient : 1955  Room/Bed: 0118/0118-01  Code Status: Full  Allergies:   Allergies   Allergen Reactions    Codeine        CHIEF COMPLAINT:      Left eye vision deficit      INTERVAL HISTORY    Initial Presentation (Admitted 2024):  Keven Iyer is a 69 y.o. right handed male with a history of hypertension, hyperlipidemia, symptomatic right ICA status post carotid endarterectomy in 2021 who presented on 2024 for to outlying facility with sudden onset vision loss in his left eye.  Patient's symptoms were ongoing for 24 hours prior to his arrival, was seen at an optometrist office and was diagnosed with an acute retinal branch artery occlusion.  CT angiogram showed moderate stenosis of left carotid artery.  Patient was loaded with 300 mg of Plavix and given 81 mg of aspirin.  Patient then had DSA with L ICA  stenting and balloon angioplasty.  Patient has been experiencing blood pressure fluctuations which started during the procedure, trying to maintain blood pressure goal -1 40, given bolus of fluid when the patient came to the ICU and started him on 125 mL/hour NS. Otherwise post tenting patient continues to be nonfocal and will monitor closely for 24 hours    Hospital Course:   : Hb dropped to 8.2 this morning, potassium 3.6- replaced.  Groin site tender but appears not to have a obvious hematoma otherwise neurologically nonfocal.  Will see maintain blood pressure goal, but did have some orthostatic changes.  Increase fluids to 150 cc/hr.  Held prophylactic dose Lovenox. To repeat Hb at noon    Interval Events:   No acute events overnight.  Patient remains hemodynamically neurologically stable.  Patient did continue to have episodes of orthostatic hypotension with positional changes, asymptomatic during these episodes.  Hemoglobin this morning is 8.0, stable when compared to prior draws.  Will

## 2024-06-22 NOTE — PROGRESS NOTES
Endovascular Neurosurgery Progress Note       Reason for evaluation: Symptomatic 70% stenosis of the proximal to mid cervical left ICA, Left BRAO     SUBJECTIVE:   NAEO.     Systolic (24hrs), Av , Min:104 , Max:159         History of Chief Complaint:      Keven Iyer is a 69 y.o. right handed male with a history of hypertension, hyperlipidemia, symptomatic right ICA status post carotid endarterectomy in 2021 who [facility] vision loss in his left eye.  Patient's symptoms were ongoing for 24 hours prior to his arrival, was seen at an optometrist office and was diagnosed with an acute retinal branch artery occlusion.  CT angiogram showed moderate stenosis of left carotid artery.  Patient was loaded with 300 mg of Plavix and given 81 mg of aspirin.  Transferred to our facility for DSA and possible stenting.  Patient was seen and examined at approximately 2:50 AM this morning. Patient has lower quadrant temporal aspect vision loss. Otherwise no other neurological deficits       Allergies  is allergic to codeine.  Medications  Prior to Admission medications    Medication Sig Start Date End Date Taking? Authorizing Provider   aspirin 81 MG chewable tablet Take 1 tablet by mouth daily    Cynthia Watts MD   sildenafil (VIAGRA) 100 MG tablet Take 1 tablet by mouth as needed for Erectile Dysfunction    Cynthia Watts MD   hydroCHLOROthiazide (HYDRODIURIL) 25 MG tablet Take 1 tablet by mouth daily    Cynthia Watts MD   metFORMIN (GLUCOPHAGE) 1000 MG tablet Take 1 tablet by mouth 2 times daily (with meals)    Cynthia Watts MD   lisinopril (PRINIVIL;ZESTRIL) 10 MG tablet Take 1 tablet by mouth daily    Cynthia Watts MD   atorvastatin (LIPITOR) 80 MG tablet Take 1 tablet by mouth daily    Cynthia Watts MD    Scheduled Meds:   sodium chloride flush  5-40 mL IntraVENous 2 times per day    sodium chloride

## 2024-06-22 NOTE — DISCHARGE SUMMARY
Neuro Critical Care   Discharge Summary      PATIENT NAME: Keven Iyer  YOB: 1955  MEDICAL RECORD NO. [id number]  DATE: 6/22/2024  PRIMARY CARE PHYSICIAN: Vernon Vincent DO  ADMISSION DATE:  6/19/2024  DISCHARGE DATE:  6/22/2024  DISCHARGE DIAGNOSIS:   Patient Active Problem List   Diagnosis Code    Acute epigastric pain R10.13    Hypertension I10    Type 2 diabetes mellitus without complication, without long-term current use of insulin (HCC) E11.9    Duodenitis K29.80    Esophagitis K20.90    Adrenal mass (HCC) E27.8    Stroke-like symptoms R29.90    CRAO (central retinal artery occlusion) H34.10    Acute ischemic left internal carotid artery (ICA) stroke ([facility]) I63.232    History of CEA (carotid endarterectomy) Z98.890    Mixed hyperlipidemia E78.2    Type 2 diabetes mellitus with hyperglycemia, without long-term current use of insulin ([facility]) E11.65    Stenosis of left internal carotid artery I65.22       HOSPITAL COURSE     Keven Iyer is a 69 y.o. yo male who presented to [facility] on 6/19/2024  2:33 AM with presented from an [facility] facility where he was seen for sudden onset vision loss in his left eye.  Patient was seen by his optometrist prior to presenting to the emergency department diagnosed with a acute retinal branch artery occlusion, CTA done at the [facility] facility showed moderate stenosis of left carotid artery, patient was loaded with aspirin and Plavix and was then taken by neuroendovascular for DSA with left ICA stenting and balloon angioplasty.  Following procedure patient was transferred to the neuro ICU for further management.  Patient's hospital course was complicated by orthostatic hypotension postoperatively in addition to acute blood loss anemia.  Patient's labs were followed daily and hemoglobin remained stable, orthostatic blood pressure and pulse showed no abnormalities, patient was then discharged home with instructions for follow-up.    Labs and imaging were  activity, weight lifting restriction  Follow-up: [facility], PCP  Time Spent for discharge: 30 minutes    RYAN Murillo - CNP  Neuro Critical Care  6/22/2024, 2:18 PM

## 2024-06-25 ENCOUNTER — TELEPHONE (OUTPATIENT)
Dept: NEUROLOGY | Age: 69
End: 2024-06-25

## 2024-06-25 NOTE — TELEPHONE ENCOUNTER
Call received from patient had surgery last week, having some swelling and soreness in the groin area, painful to touch, not warm to the touch. Please advise, he does have a f/up appt. 07/26/2024.

## 2024-07-16 ENCOUNTER — TELEPHONE (OUTPATIENT)
Dept: ONCOLOGY | Age: 69
End: 2024-07-16

## 2024-07-16 NOTE — TELEPHONE ENCOUNTER
Writer called to [facility] to ask for records from 24 physician appointment at [facility] to update Dr. Pollock and reschedule patient.  [facility] staff at multiple calls state patient is not scheduled.    Today, 24 again called [facility] at [phone] and spoke with Danica; she upon looking states no appointments scheduled or completed for this patient (Maris referral)- but does have a future colonoscopy scheduled with a different physician.    Explained this does not make sense as I was informed by [facility] that patient had a 24 appointment at 3 pm and he was to arrive at  [facility], [address], [place] [address].  I explained I would not have had this information if not provided by [facility] staff.  She offers to send me to general surgery to see if they have further information.    Transferred to Davonte at general surgery.  He immediately finds chart and states patient did have appointment  but on  Dr. Vi Starks had to cancel the appointment and reschedule.  Davonte states patient is scheduled for 2024 at 2 pm but is also on a cancellation list for sooner appointment.  Direct number to Surgical Oncology/Dr. Vi Starks is [phone] for future questions.  Davonte suggests that there are many \"Keven Iyer\" with  of \"1955\" and he confirmed correct patient by reviewing patient address and phone number to find the proper chart.    Will discuss with Dr. Pollock about waiting for August appointment.

## 2024-07-26 ENCOUNTER — OFFICE VISIT (OUTPATIENT)
Dept: NEUROLOGY | Age: 69
End: 2024-07-26
Payer: MEDICARE

## 2024-07-26 VITALS
BODY MASS INDEX: 23.99 KG/M2 | SYSTOLIC BLOOD PRESSURE: 181 MMHG | DIASTOLIC BLOOD PRESSURE: 76 MMHG | HEIGHT: 65 IN | HEART RATE: 63 BPM | WEIGHT: 144 LBS

## 2024-07-26 DIAGNOSIS — H34.12 CENTRAL RETINAL ARTERY OCCLUSION OF LEFT EYE: Primary | ICD-10-CM

## 2024-07-26 PROCEDURE — G8420 CALC BMI NORM PARAMETERS: HCPCS | Performed by: PSYCHIATRY & NEUROLOGY

## 2024-07-26 PROCEDURE — 1123F ACP DISCUSS/DSCN MKR DOCD: CPT | Performed by: PSYCHIATRY & NEUROLOGY

## 2024-07-26 PROCEDURE — 3078F DIAST BP <80 MM HG: CPT | Performed by: PSYCHIATRY & NEUROLOGY

## 2024-07-26 PROCEDURE — G8427 DOCREV CUR MEDS BY ELIG CLIN: HCPCS | Performed by: PSYCHIATRY & NEUROLOGY

## 2024-07-26 PROCEDURE — 3017F COLORECTAL CA SCREEN DOC REV: CPT | Performed by: PSYCHIATRY & NEUROLOGY

## 2024-07-26 PROCEDURE — 1036F TOBACCO NON-USER: CPT | Performed by: PSYCHIATRY & NEUROLOGY

## 2024-07-26 PROCEDURE — 99215 OFFICE O/P EST HI 40 MIN: CPT | Performed by: PSYCHIATRY & NEUROLOGY

## 2024-07-26 PROCEDURE — 3077F SYST BP >= 140 MM HG: CPT | Performed by: PSYCHIATRY & NEUROLOGY

## 2024-07-26 RX ORDER — THIAMINE MONONITRATE (VIT B1) 100 MG
100 TABLET ORAL DAILY
COMMUNITY

## 2024-07-26 RX ORDER — CLOPIDOGREL BISULFATE 75 MG/1
75 TABLET ORAL DAILY
Qty: 30 TABLET | Refills: 4 | Status: SHIPPED | OUTPATIENT
Start: 2024-07-26

## 2024-07-26 NOTE — PROGRESS NOTES
Provider, MD Cynthia   lisinopril (PRINIVIL;ZESTRIL) 10 MG tablet Take 0.5 tablets by mouth daily    Cynthia Watts MD   atorvastatin (LIPITOR) 80 MG tablet Take 1 tablet by mouth daily    ProviderCynthia MD    Scheduled Meds:      Continuous Infusions:      PRN Meds:.  Past Medical History   has a past medical history of Adrenal mass (HCC), CVA (cerebral vascular accident) (HCC), Diabetes mellitus (HCC), Esophageal reflux, HLD (hyperlipidemia), and Hypertension.  Past Surgical History   has a past surgical history that includes Carotid endarterectomy (01/04/2021); Upper gastrointestinal endoscopy (N/A, 03/28/2024); Esophagogastroduodenoscopy (03/28/2024); and Cerebral angiogram (06/20/2024).  Social History   reports that he quit smoking about 37 years ago. His smoking use included cigarettes. He started smoking about 49 years ago. He has a 6.0 pack-year smoking history. He has never used smokeless tobacco.   reports current alcohol use.   reports no history of drug use.  Family History  family history includes Cancer in his father; Diabetes in his mother; Heart Disease in his mother; Stroke in his brother and sister.    Review of Systems:  CONSTITUTIONAL:  negative for fevers, chills, fatigue and malaise    EYES:  negative for double vision, blurred vision and photophobia     HEENT:  negative for tinnitus, epistaxis and sore throat    RESPIRATORY:  negative for cough, shortness of breath, wheezing    CARDIOVASCULAR:  negative for chest pain, palpitations, syncope, edema    GASTROINTESTINAL:  negative for nausea, vomiting    GENITOURINARY:  negative for incontinence    MUSCULOSKELETAL:  negative for neck or back pain    NEUROLOGICAL:  As HP   PSYCHIATRIC:  negative for anxiety      Review of systems otherwise negative.      OBJECTIVE:     There were no vitals filed for this visit.       General:  Gen: normal habitus, NAD  HEENT: NCAT, mucosa moist  Cvs: RRR, S1 S2 normal  Resp: symmetric unlabored

## 2024-09-16 ENCOUNTER — HOSPITAL ENCOUNTER (OUTPATIENT)
Dept: CT IMAGING | Age: 69
Discharge: HOME | End: 2024-09-18
Payer: MEDICARE

## 2024-09-16 ENCOUNTER — HOSPITAL ENCOUNTER (OUTPATIENT)
Age: 69
Discharge: HOME | End: 2024-09-16
Payer: MEDICARE

## 2024-09-16 DIAGNOSIS — H34.12 CENTRAL RETINAL ARTERY OCCLUSION OF LEFT EYE: ICD-10-CM

## 2024-09-16 LAB
CREAT SERPL-MCNC: 0.9 MG/DL (ref 0.7–1.2)
GFR, ESTIMATED: >90 ML/MIN/1.73M2

## 2024-09-16 PROCEDURE — 82565 ASSAY OF CREATININE: CPT

## 2024-09-16 PROCEDURE — 36415 COLL VENOUS BLD VENIPUNCTURE: CPT

## 2024-09-16 PROCEDURE — 70496 CT ANGIOGRAPHY HEAD: CPT

## 2024-09-16 PROCEDURE — 6360000004 HC RX CONTRAST MEDICATION: Performed by: STUDENT IN AN ORGANIZED HEALTH CARE EDUCATION/TRAINING PROGRAM

## 2024-09-16 RX ORDER — IOPAMIDOL 755 MG/ML
75 INJECTION, SOLUTION INTRAVASCULAR
Status: COMPLETED | OUTPATIENT
Start: 2024-09-16 | End: 2024-09-16

## 2024-09-16 RX ADMIN — IOPAMIDOL 75 ML: 755 INJECTION, SOLUTION INTRAVENOUS at 08:59

## 2024-09-30 ENCOUNTER — HOSPITAL ENCOUNTER (OUTPATIENT)
Dept: INFUSION THERAPY | Age: 69
Discharge: HOME | End: 2024-09-30

## 2024-09-30 VITALS
DIASTOLIC BLOOD PRESSURE: 53 MMHG | HEART RATE: 64 BPM | RESPIRATION RATE: 20 BRPM | OXYGEN SATURATION: 100 % | SYSTOLIC BLOOD PRESSURE: 146 MMHG

## 2024-09-30 RX ORDER — POLYETHYLENE GLYCOL 3350 17 G/17G
17 POWDER, FOR SOLUTION ORAL DAILY PRN
COMMUNITY

## 2024-09-30 RX ORDER — OXYCODONE HYDROCHLORIDE 5 MG/1
5 TABLET ORAL EVERY 6 HOURS PRN
COMMUNITY

## 2024-09-30 RX ORDER — HYDROCHLOROTHIAZIDE 25 MG/1
25 TABLET ORAL DAILY
COMMUNITY

## 2024-09-30 RX ORDER — SENNOSIDES A AND B 8.6 MG/1
2 TABLET, FILM COATED ORAL DAILY
COMMUNITY

## 2024-09-30 RX ORDER — ACETAMINOPHEN 325 MG/1
650 TABLET ORAL EVERY 6 HOURS PRN
COMMUNITY

## 2024-09-30 RX ORDER — TAMSULOSIN HYDROCHLORIDE 0.4 MG/1
0.4 CAPSULE ORAL DAILY
COMMUNITY

## 2024-09-30 NOTE — PROGRESS NOTES
Avilez Catheter removed without difficulty.  Patient tolerates well.  600ml of clear urine noted in the catheter bag.  Patient was able to void without dificulty before leaving the unit.

## 2024-10-18 ENCOUNTER — TELEPHONE (OUTPATIENT)
Dept: ONCOLOGY | Age: 69
End: 2024-10-18

## 2024-10-18 NOTE — TELEPHONE ENCOUNTER
Spoke to patient to schedule appointment to discuss Pathology report. Patient stated Reviewed with Dr Starks at [facility].  Stated he is to follow up with PCP Pao Alfaro.  Did not want to make appointment at this time.

## 2024-10-21 ENCOUNTER — OFFICE VISIT (OUTPATIENT)
Dept: NEUROLOGY | Age: 69
End: 2024-10-21
Payer: MEDICARE

## 2024-10-21 VITALS
DIASTOLIC BLOOD PRESSURE: 69 MMHG | BODY MASS INDEX: 24.16 KG/M2 | WEIGHT: 145 LBS | SYSTOLIC BLOOD PRESSURE: 120 MMHG | HEART RATE: 65 BPM | HEIGHT: 65 IN

## 2024-10-21 DIAGNOSIS — Z98.890 HISTORY OF CEA (CAROTID ENDARTERECTOMY): Primary | ICD-10-CM

## 2024-10-21 DIAGNOSIS — H34.12 CENTRAL RETINAL ARTERY OCCLUSION OF LEFT EYE: ICD-10-CM

## 2024-10-21 DIAGNOSIS — Z86.73 HISTORY OF STROKE: ICD-10-CM

## 2024-10-21 PROCEDURE — 99214 OFFICE O/P EST MOD 30 MIN: CPT | Performed by: PSYCHIATRY & NEUROLOGY

## 2024-10-21 PROCEDURE — G8420 CALC BMI NORM PARAMETERS: HCPCS | Performed by: PSYCHIATRY & NEUROLOGY

## 2024-10-21 PROCEDURE — 1036F TOBACCO NON-USER: CPT | Performed by: PSYCHIATRY & NEUROLOGY

## 2024-10-21 PROCEDURE — 1123F ACP DISCUSS/DSCN MKR DOCD: CPT | Performed by: PSYCHIATRY & NEUROLOGY

## 2024-10-21 PROCEDURE — G8427 DOCREV CUR MEDS BY ELIG CLIN: HCPCS | Performed by: PSYCHIATRY & NEUROLOGY

## 2024-10-21 PROCEDURE — G8484 FLU IMMUNIZE NO ADMIN: HCPCS | Performed by: PSYCHIATRY & NEUROLOGY

## 2024-10-21 PROCEDURE — 3074F SYST BP LT 130 MM HG: CPT | Performed by: PSYCHIATRY & NEUROLOGY

## 2024-10-21 PROCEDURE — 3078F DIAST BP <80 MM HG: CPT | Performed by: PSYCHIATRY & NEUROLOGY

## 2024-10-21 PROCEDURE — 3017F COLORECTAL CA SCREEN DOC REV: CPT | Performed by: PSYCHIATRY & NEUROLOGY

## 2024-11-20 NOTE — PROGRESS NOTES
Occupational Therapy    [facility]  Occupational Therapy Not Seen Note    DATE: 2024    NAME: Keven Iyer  MRN: [id number]   : 1955      Patient not seen this date for Occupational Therapy due to:    Surgery/Procedure: cerebral angiogram with cervical carotid stenting and balloon angioplasty with distal embolic protection device    Electronically signed by SIS MARIE on 2024 at 5:23 PM    Refill Routing Note   Medication(s) are not appropriate for processing by [facility] for the following reason(s):        Outside of protocol    [facility] action(s):  Route             Appointments  past 12m or future 3m with PCP    Date Provider   Last Visit   9/18/2024 Florentin Marlow MD   Next Visit   3/18/2025 Florentin Marlow MD   ED visits in past 90 days: 0        Note composed:11:24 AM 11/20/2024

## 2025-02-12 ENCOUNTER — TELEPHONE (OUTPATIENT)
Dept: NEUROLOGY | Age: 70
End: 2025-02-12

## 2025-02-12 NOTE — TELEPHONE ENCOUNTER
Patient called in regards to carotid US order placed by Dr Romano- he states radiology needs this order clarified and dx added. Please advise, thank you!

## 2025-02-13 DIAGNOSIS — H34.12 CENTRAL RETINAL ARTERY OCCLUSION OF LEFT EYE: Primary | ICD-10-CM

## 2025-04-16 ENCOUNTER — HOSPITAL ENCOUNTER (OUTPATIENT)
Dept: VASCULAR LAB | Age: 70
Discharge: HOME | End: 2025-04-18
Payer: MEDICARE

## 2025-04-16 DIAGNOSIS — H34.12 CENTRAL RETINAL ARTERY OCCLUSION OF LEFT EYE: ICD-10-CM

## 2025-04-16 PROCEDURE — 93880 EXTRACRANIAL BILAT STUDY: CPT

## 2025-04-17 LAB
VAS LEFT CCA DIST EDV: 25.5 CM/S
VAS LEFT CCA DIST PSV: 193.7 CM/S
VAS LEFT CCA MID EDV: 35.24 CM/S
VAS LEFT CCA MID PSV: 190.43 CM/S
VAS LEFT CCA PROX EDV: 12.8 CM/S
VAS LEFT CCA PROX PSV: 67.1 CM/S
VAS LEFT ECA EDV: 11.38 CM/S
VAS LEFT ECA PSV: 113.6 CM/S
VAS LEFT ICA DIST EDV: 24.1 CM/S
VAS LEFT ICA DIST PSV: 95.2 CM/S
VAS LEFT ICA MID EDV: 27.6 CM/S
VAS LEFT ICA MID PSV: 127.9 CM/S
VAS LEFT ICA PROX EDV: 32 CM/S
VAS LEFT ICA PROX PSV: 177.5 CM/S
VAS LEFT ICA/CCA PSV: 0.92 NO UNITS
VAS LEFT VERTEBRAL EDV: 7.58 CM/S
VAS LEFT VERTEBRAL PSV: 66.9 CM/S
VAS RIGHT CCA DIST EDV: 11.6 CM/S
VAS RIGHT CCA DIST PSV: 90.5 CM/S
VAS RIGHT CCA MID EDV: 11.14 CM/S
VAS RIGHT CCA MID PSV: 96.74 CM/S
VAS RIGHT CCA PROX EDV: 11.1 CM/S
VAS RIGHT CCA PROX PSV: 88.7 CM/S
VAS RIGHT ECA EDV: 0 CM/S
VAS RIGHT ECA PSV: 118.1 CM/S
VAS RIGHT ICA DIST EDV: 8.8 CM/S
VAS RIGHT ICA DIST PSV: 53.1 CM/S
VAS RIGHT ICA MID EDV: 17.5 CM/S
VAS RIGHT ICA MID PSV: 78.7 CM/S
VAS RIGHT ICA PROX EDV: 15.6 CM/S
VAS RIGHT ICA PROX PSV: 104.3 CM/S
VAS RIGHT ICA/CCA PSV: 1.2 NO UNITS
VAS RIGHT VERTEBRAL EDV: 8.93 CM/S
VAS RIGHT VERTEBRAL PSV: 58.1 CM/S

## 2025-06-25 NOTE — DISCHARGE INSTR - DIET
Good nutrition is important when healing from an illness, injury, or surgery.  Follow any nutrition recommendations given to you during your hospital stay.   If you were given an oral nutrition supplement while in the hospital, continue to take this supplement at home.  You can take it with meals, in-between meals, and/or before bedtime. These supplements can be purchased at most local grocery stores, pharmacies, and [employer]-stores.   If you have any questions about your diet or nutrition, call the hospital and ask for the dietitian.  Regular         Avoid foods that make your symptoms worse. These may include chocolate, mint, alcohol, pepper, spicy foods, high-fat foods, or drinks with caffeine in them, such as tea, coffee, betty, or energy drinks.            Group Topic: BH Check-in/Symptom Rating    Date: 6/24/2025  Start Time: 1940  End Time: 2015  Facilitators: [facility]    Focus: Evening wrap up   Number in attendance: 13    Method: Group  Attendance: Late  Participation: Minimal  Patient Response: Poor eye contact and Quiet  Mood: Anxious  Affect: Type: Anxious   Range: Blunted/flat   Congruency: Congruent   Stability: Stable  Behavior/Socialization: Indifferent  Thought Process: Unremarkable  Task Performance: Needs clarification and Needs cueing  Patient Evaluation: Leaves class before finished    Pt came to group late, and asked what we were discussing. Pt then left group early without participating.

## (undated) DEVICE — CANNULA ORAL NSL AD CO2 N INTUB O2 DEL DISP TRU LNK

## (undated) DEVICE — FORCEP SPEC RETRV BX AD 2 MMX155 CM 5 MM GI OVL CUP W/ NDL

## (undated) DEVICE — TUBING SUCT NON-STRL 9/32X100 W/CNNT

## (undated) DEVICE — SOLUTION IRRIG 1000ML 0.9% SOD CHL USP POUR PLAS BTL